# Patient Record
Sex: FEMALE | Race: WHITE | NOT HISPANIC OR LATINO | Employment: FULL TIME | URBAN - METROPOLITAN AREA
[De-identification: names, ages, dates, MRNs, and addresses within clinical notes are randomized per-mention and may not be internally consistent; named-entity substitution may affect disease eponyms.]

---

## 2017-01-19 ENCOUNTER — COMMUNICATION - HEALTHEAST (OUTPATIENT)
Dept: PHYSICAL MEDICINE AND REHAB | Facility: CLINIC | Age: 27
End: 2017-01-19

## 2017-01-19 DIAGNOSIS — M54.2 CERVICALGIA: ICD-10-CM

## 2017-01-19 DIAGNOSIS — M54.50 LUMBAGO: ICD-10-CM

## 2017-01-19 DIAGNOSIS — M54.6 PAIN IN THORACIC SPINE: ICD-10-CM

## 2017-01-25 ENCOUNTER — OFFICE VISIT - HEALTHEAST (OUTPATIENT)
Dept: FAMILY MEDICINE | Facility: CLINIC | Age: 27
End: 2017-01-25

## 2017-01-25 DIAGNOSIS — G43.109 CLASSIC MIGRAINE: ICD-10-CM

## 2017-04-10 ENCOUNTER — COMMUNICATION - HEALTHEAST (OUTPATIENT)
Dept: FAMILY MEDICINE | Facility: CLINIC | Age: 27
End: 2017-04-10

## 2017-05-03 ENCOUNTER — RECORDS - HEALTHEAST (OUTPATIENT)
Dept: ADMINISTRATIVE | Facility: OTHER | Age: 27
End: 2017-05-03

## 2017-06-06 ENCOUNTER — COMMUNICATION - HEALTHEAST (OUTPATIENT)
Dept: FAMILY MEDICINE | Facility: CLINIC | Age: 27
End: 2017-06-06

## 2017-06-06 DIAGNOSIS — F41.9 ANXIETY: ICD-10-CM

## 2017-06-06 DIAGNOSIS — F31.12 BIPOLAR I DISORDER, MOST RECENT EPISODE (OR CURRENT) MANIC, MODERATE (H): ICD-10-CM

## 2017-06-08 ENCOUNTER — HOSPITAL ENCOUNTER (OUTPATIENT)
Dept: PHYSICAL MEDICINE AND REHAB | Facility: CLINIC | Age: 27
Discharge: HOME | End: 2017-06-08
Attending: PHYSICIAN ASSISTANT

## 2017-06-08 DIAGNOSIS — G89.29 CHRONIC BILATERAL LOW BACK PAIN WITHOUT SCIATICA: ICD-10-CM

## 2017-06-08 DIAGNOSIS — M54.2 CERVICALGIA: ICD-10-CM

## 2017-06-08 DIAGNOSIS — M54.6 THORACIC BACK PAIN: ICD-10-CM

## 2017-06-08 DIAGNOSIS — M54.50 CHRONIC BILATERAL LOW BACK PAIN WITHOUT SCIATICA: ICD-10-CM

## 2017-06-12 ENCOUNTER — COMMUNICATION - HEALTHEAST (OUTPATIENT)
Dept: SCHEDULING | Facility: CLINIC | Age: 27
End: 2017-06-12

## 2017-06-22 ENCOUNTER — RECORDS - HEALTHEAST (OUTPATIENT)
Dept: ADMINISTRATIVE | Facility: OTHER | Age: 27
End: 2017-06-22

## 2017-06-23 ENCOUNTER — RECORDS - HEALTHEAST (OUTPATIENT)
Dept: ADMINISTRATIVE | Facility: OTHER | Age: 27
End: 2017-06-23

## 2017-09-18 ENCOUNTER — OFFICE VISIT - HEALTHEAST (OUTPATIENT)
Dept: FAMILY MEDICINE | Facility: CLINIC | Age: 27
End: 2017-09-18

## 2017-09-18 DIAGNOSIS — R63.5 ABNORMAL WEIGHT GAIN: ICD-10-CM

## 2017-09-18 DIAGNOSIS — J30.9 ALLERGIC RHINITIS: ICD-10-CM

## 2017-09-18 LAB
CHOLEST SERPL-MCNC: 203 MG/DL
FASTING STATUS PATIENT QL REPORTED: NO
HBA1C MFR BLD: 5.4 % (ref 3.5–6)
HDLC SERPL-MCNC: 63 MG/DL
LDLC SERPL CALC-MCNC: 84 MG/DL
TRIGL SERPL-MCNC: 281 MG/DL

## 2017-09-20 ENCOUNTER — COMMUNICATION - HEALTHEAST (OUTPATIENT)
Dept: FAMILY MEDICINE | Facility: CLINIC | Age: 27
End: 2017-09-20

## 2017-10-04 ENCOUNTER — COMMUNICATION - HEALTHEAST (OUTPATIENT)
Dept: FAMILY MEDICINE | Facility: CLINIC | Age: 27
End: 2017-10-04

## 2017-10-04 DIAGNOSIS — Z30.9 CONTRACEPTION MANAGEMENT: ICD-10-CM

## 2017-10-05 ENCOUNTER — COMMUNICATION - HEALTHEAST (OUTPATIENT)
Dept: PHYSICAL MEDICINE AND REHAB | Facility: CLINIC | Age: 27
End: 2017-10-05

## 2017-10-05 ENCOUNTER — COMMUNICATION - HEALTHEAST (OUTPATIENT)
Dept: FAMILY MEDICINE | Facility: CLINIC | Age: 27
End: 2017-10-05

## 2017-10-06 ENCOUNTER — COMMUNICATION - HEALTHEAST (OUTPATIENT)
Dept: FAMILY MEDICINE | Facility: CLINIC | Age: 27
End: 2017-10-06

## 2017-10-06 ENCOUNTER — COMMUNICATION - HEALTHEAST (OUTPATIENT)
Dept: PHYSICAL MEDICINE AND REHAB | Facility: CLINIC | Age: 27
End: 2017-10-06

## 2017-10-10 ENCOUNTER — AMBULATORY - HEALTHEAST (OUTPATIENT)
Dept: FAMILY MEDICINE | Facility: CLINIC | Age: 27
End: 2017-10-10

## 2017-10-10 ENCOUNTER — OFFICE VISIT - HEALTHEAST (OUTPATIENT)
Dept: FAMILY MEDICINE | Facility: CLINIC | Age: 27
End: 2017-10-10

## 2017-10-10 DIAGNOSIS — Z01.818 PREOPERATIVE EVALUATION TO RULE OUT SURGICAL CONTRAINDICATION: ICD-10-CM

## 2017-10-10 DIAGNOSIS — Z23 NEEDS FLU SHOT: ICD-10-CM

## 2017-10-10 DIAGNOSIS — N62 LARGE BREASTS: ICD-10-CM

## 2017-12-26 ENCOUNTER — COMMUNICATION - HEALTHEAST (OUTPATIENT)
Dept: FAMILY MEDICINE | Facility: CLINIC | Age: 27
End: 2017-12-26

## 2017-12-26 DIAGNOSIS — Z30.9 CONTRACEPTION MANAGEMENT: ICD-10-CM

## 2018-02-26 ENCOUNTER — OFFICE VISIT - HEALTHEAST (OUTPATIENT)
Dept: FAMILY MEDICINE | Facility: CLINIC | Age: 28
End: 2018-02-26

## 2018-02-26 DIAGNOSIS — Z30.9 CONTRACEPTION MANAGEMENT: ICD-10-CM

## 2018-02-26 DIAGNOSIS — Z01.419 VISIT FOR ROUTINE GYN EXAM: ICD-10-CM

## 2018-02-28 LAB
BKR LAB AP ABNORMAL BLEEDING: NO
BKR LAB AP BIRTH CONTROL/HORMONES: NORMAL
BKR LAB AP CERVICAL APPEARANCE: NORMAL
BKR LAB AP GYN ADEQUACY: NORMAL
BKR LAB AP GYN INTERPRETATION: NORMAL
BKR LAB AP HPV REFLEX: NORMAL
BKR LAB AP LMP: NORMAL
BKR LAB AP PATIENT STATUS: NORMAL
BKR LAB AP PREVIOUS ABNORMAL: NORMAL
BKR LAB AP PREVIOUS NORMAL: 2015
HIGH RISK?: NO
PATH REPORT.COMMENTS IMP SPEC: NORMAL
RESULT FLAG (HE HISTORICAL CONVERSION): NORMAL

## 2018-11-30 ENCOUNTER — COMMUNICATION - HEALTHEAST (OUTPATIENT)
Dept: FAMILY MEDICINE | Facility: CLINIC | Age: 28
End: 2018-11-30

## 2018-11-30 DIAGNOSIS — J30.9 ALLERGIC RHINITIS: ICD-10-CM

## 2018-12-07 ENCOUNTER — COMMUNICATION - HEALTHEAST (OUTPATIENT)
Dept: FAMILY MEDICINE | Facility: CLINIC | Age: 28
End: 2018-12-07

## 2019-01-01 ENCOUNTER — RECORDS - HEALTHEAST (OUTPATIENT)
Dept: ADMINISTRATIVE | Facility: OTHER | Age: 29
End: 2019-01-01

## 2019-03-14 ENCOUNTER — COMMUNICATION - HEALTHEAST (OUTPATIENT)
Dept: FAMILY MEDICINE | Facility: CLINIC | Age: 29
End: 2019-03-14

## 2019-03-15 ENCOUNTER — OFFICE VISIT - HEALTHEAST (OUTPATIENT)
Dept: FAMILY MEDICINE | Facility: CLINIC | Age: 29
End: 2019-03-15

## 2019-03-15 DIAGNOSIS — Z00.00 ROUTINE GENERAL MEDICAL EXAMINATION AT A HEALTH CARE FACILITY: ICD-10-CM

## 2019-03-15 DIAGNOSIS — F41.1 GENERALIZED ANXIETY DISORDER: ICD-10-CM

## 2019-03-15 DIAGNOSIS — J30.9 ALLERGIC RHINITIS: ICD-10-CM

## 2019-03-15 DIAGNOSIS — F39 MOOD DISORDER (H): ICD-10-CM

## 2019-03-15 ASSESSMENT — MIFFLIN-ST. JEOR: SCORE: 1418.96

## 2019-06-18 ENCOUNTER — COMMUNICATION - HEALTHEAST (OUTPATIENT)
Dept: FAMILY MEDICINE | Facility: CLINIC | Age: 29
End: 2019-06-18

## 2019-12-12 ENCOUNTER — OFFICE VISIT - HEALTHEAST (OUTPATIENT)
Dept: FAMILY MEDICINE | Facility: CLINIC | Age: 29
End: 2019-12-12

## 2019-12-12 DIAGNOSIS — Z71.84 TRAVEL ADVICE ENCOUNTER: ICD-10-CM

## 2019-12-12 ASSESSMENT — MIFFLIN-ST. JEOR: SCORE: 1431.43

## 2020-07-23 ENCOUNTER — COMMUNICATION - HEALTHEAST (OUTPATIENT)
Dept: FAMILY MEDICINE | Facility: CLINIC | Age: 30
End: 2020-07-23

## 2020-07-23 DIAGNOSIS — J30.9 ALLERGIC RHINITIS: ICD-10-CM

## 2020-09-30 ENCOUNTER — COMMUNICATION - HEALTHEAST (OUTPATIENT)
Dept: FAMILY MEDICINE | Facility: CLINIC | Age: 30
End: 2020-09-30

## 2021-02-05 ENCOUNTER — OFFICE VISIT - HEALTHEAST (OUTPATIENT)
Dept: FAMILY MEDICINE | Facility: CLINIC | Age: 31
End: 2021-02-05

## 2021-02-05 ENCOUNTER — COMMUNICATION - HEALTHEAST (OUTPATIENT)
Dept: FAMILY MEDICINE | Facility: CLINIC | Age: 31
End: 2021-02-05

## 2021-02-05 DIAGNOSIS — Z23 NEED FOR PROPHYLACTIC VACCINATION WITH TETANUS-DIPHTHERIA (TD): ICD-10-CM

## 2021-02-05 DIAGNOSIS — L70.0 CYSTIC ACNE: ICD-10-CM

## 2021-02-05 DIAGNOSIS — Z12.4 SCREENING FOR CERVICAL CANCER: ICD-10-CM

## 2021-02-05 DIAGNOSIS — G24.5 BLEPHAROSPASM OF RIGHT EYE: ICD-10-CM

## 2021-02-05 DIAGNOSIS — Z00.00 ROUTINE GENERAL MEDICAL EXAMINATION AT A HEALTH CARE FACILITY: ICD-10-CM

## 2021-02-05 DIAGNOSIS — Z13.220 LIPID SCREENING: ICD-10-CM

## 2021-02-05 DIAGNOSIS — T78.3XXD ANGIOEDEMA, SUBSEQUENT ENCOUNTER: ICD-10-CM

## 2021-02-05 LAB
ANION GAP SERPL CALCULATED.3IONS-SCNC: 6 MMOL/L (ref 5–18)
BUN SERPL-MCNC: 11 MG/DL (ref 8–22)
CALCIUM SERPL-MCNC: 9.7 MG/DL (ref 8.5–10.5)
CHLORIDE BLD-SCNC: 103 MMOL/L (ref 98–107)
CHOLEST SERPL-MCNC: 196 MG/DL
CO2 SERPL-SCNC: 27 MMOL/L (ref 22–31)
CREAT SERPL-MCNC: 0.77 MG/DL (ref 0.6–1.1)
FASTING STATUS PATIENT QL REPORTED: YES
GFR SERPL CREATININE-BSD FRML MDRD: >60 ML/MIN/1.73M2
GLUCOSE BLD-MCNC: 83 MG/DL (ref 70–125)
HBA1C MFR BLD: 5.5 %
HDLC SERPL-MCNC: 49 MG/DL
LDLC SERPL CALC-MCNC: 132 MG/DL
POTASSIUM BLD-SCNC: 4.3 MMOL/L (ref 3.5–5)
SODIUM SERPL-SCNC: 136 MMOL/L (ref 136–145)
TRIGL SERPL-MCNC: 77 MG/DL

## 2021-02-05 ASSESSMENT — MIFFLIN-ST. JEOR: SCORE: 1499.69

## 2021-02-08 LAB
HPV SOURCE: NORMAL
HUMAN PAPILLOMA VIRUS 16 DNA: NEGATIVE
HUMAN PAPILLOMA VIRUS 18 DNA: NEGATIVE
HUMAN PAPILLOMA VIRUS FINAL DIAGNOSIS: NORMAL
HUMAN PAPILLOMA VIRUS OTHER HR: NEGATIVE
SPECIMEN DESCRIPTION: NORMAL

## 2021-02-09 ENCOUNTER — COMMUNICATION - HEALTHEAST (OUTPATIENT)
Dept: FAMILY MEDICINE | Facility: CLINIC | Age: 31
End: 2021-02-09

## 2021-02-11 ENCOUNTER — RECORDS - HEALTHEAST (OUTPATIENT)
Dept: ADMINISTRATIVE | Facility: OTHER | Age: 31
End: 2021-02-11

## 2021-02-25 ENCOUNTER — RECORDS - HEALTHEAST (OUTPATIENT)
Dept: ADMINISTRATIVE | Facility: OTHER | Age: 31
End: 2021-02-25

## 2021-02-26 ENCOUNTER — AMBULATORY - HEALTHEAST (OUTPATIENT)
Dept: NURSING | Facility: CLINIC | Age: 31
End: 2021-02-26

## 2021-03-19 ENCOUNTER — AMBULATORY - HEALTHEAST (OUTPATIENT)
Dept: NURSING | Facility: CLINIC | Age: 31
End: 2021-03-19

## 2021-05-30 VITALS — WEIGHT: 134 LBS | BODY MASS INDEX: 23.74 KG/M2

## 2021-05-31 VITALS — WEIGHT: 173.8 LBS | BODY MASS INDEX: 30.79 KG/M2

## 2021-05-31 VITALS — WEIGHT: 156 LBS | BODY MASS INDEX: 27.63 KG/M2

## 2021-05-31 VITALS — BODY MASS INDEX: 30.79 KG/M2 | HEIGHT: 63 IN

## 2021-06-01 VITALS — BODY MASS INDEX: 29.41 KG/M2 | WEIGHT: 166 LBS

## 2021-06-02 VITALS — HEIGHT: 63 IN | WEIGHT: 162 LBS | BODY MASS INDEX: 28.7 KG/M2

## 2021-06-04 VITALS
HEART RATE: 72 BPM | HEIGHT: 63 IN | SYSTOLIC BLOOD PRESSURE: 100 MMHG | WEIGHT: 164.75 LBS | BODY MASS INDEX: 29.19 KG/M2 | DIASTOLIC BLOOD PRESSURE: 72 MMHG | OXYGEN SATURATION: 99 %

## 2021-06-04 NOTE — PROGRESS NOTES
HPI:  Patient is a 30 yo female who presents today for travel consult.    Patient is leaving December 22nd to [facility] with her family for 2.5 weeks.  They will be staying with her uncle who has lived there for over 30 years.  She will be staying mostly in larger cities, but there will be some jungle treks.  She has travelled abroad in the past.    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  Her boss is very supportive of her leave.    Patient Active Problem List    Diagnosis Date Noted     Reflux esophagitis 01/25/2015     Unspecified vitamin D deficiency 01/25/2015     Polyarthritis Of Multiple Sites      Neck Pain      Lower Back Pain      Allergic Rhinitis      Acne      Irregular Length Of Menstrual Periods      External Hemorrhoids With Bleeding      Perioral Dermatitis      Abnormal Weight Gain      Midback Pain        Current Outpatient Medications:      ALPRAZolam (XANAX) 0.5 MG tablet, Take 0.5 mg by mouth daily as needed for sleep., Disp: , Rfl:      clindamycin (CLEOCIN T) 1 % Swab, APPLY TOPICALLY TO AFFECTED AREA BID, Disp: , Rfl: 5     cyproheptadine (PERIACTIN) 4 mg tablet, Take 4 mg by mouth as needed. As need for stomach episodes, Disp: , Rfl:      dapsone 5 % topical gel, SARAH EXT AA BID, Disp: , Rfl: 5     docosahexanoic acid/epa (FISH OIL ORAL), [place] 3, Disp: , Rfl:      fluticasone (FLONASE) 50 mcg/actuation nasal spray, SHAKE LIQUID AND USE 1 SPRAY IN EACH NOSTRIL DAILY, Disp: 48 g, Rfl: 6     hydrOXYzine HCl (ATARAX) 10 MG tablet, Take 10 mg by mouth., Disp: , Rfl:      spironolactone (ALDACTONE) 50 MG tablet, Take 50 mg by mouth 2 (two) times a day., Disp: , Rfl: 6     triamcinolone (KENALOG) 0.1 % cream, Apply topically., Disp: , Rfl:      atovaquone-proguanil (MALARONE) 250-100 mg Tab, Take one tablet daily with breakfast, start 3 days before the trip and continue for 7 days after  "return., Disp: 24 tablet, Rfl: 0     minocycline (MINOCIN,DYNACIN) 50 MG capsule, Take 50 mg by mouth., Disp: , Rfl:      naproxen (NAPROSYN) 500 MG tablet, Take 1 tablet (500 mg total) by mouth 2 (two) times a day as needed., Disp: 60 tablet, Rfl: 2      Objective     /72 (Patient Site: Left Arm, Patient Position: Sitting, Cuff Size: Adult Regular)   Pulse 72   Ht 5' 3\" (1.6 m)   Wt 164 lb 12 oz (74.7 kg)   SpO2 99%   BMI 29.18 kg/m    General appearance: alert, appears stated age and cooperative     Loyda was seen today for travel consult.    Diagnoses and all orders for this visit:    Travel advice encounter  -     Typhoid, Inactive, Inj  -     atovaquone-proguanil (MALARONE) 250-100 mg Tab; Take one tablet daily with breakfast, start 3 days before the trip and continue for 7 days after return.  -     azithromycin (ZITHROMAX) 500 MG tablet; Take 1 tablet (500 mg total) by mouth daily for 3 days. For diarrhea with fever or blood.  Reviewed vaccination recommendations for travelers to [facility] per the CDC.  Recommend Typhoid vaccination by injection today as she does not have time to complete the oral vaccine.  Reviewed options for malaria prophylaxis.  Patient would like to move forward with malarone due to her history of Bipolar Disorder.  Reviewed recommendations for avoidance of traveller's diarrhea.  Discussed the need for DEET mosquito spray as not all mosquito borne illnesses are vaccine preventable.    Other orders  -     Influenza, Seasonal Quad, PF =/> 6months          "

## 2021-06-05 VITALS
OXYGEN SATURATION: 99 % | SYSTOLIC BLOOD PRESSURE: 112 MMHG | HEART RATE: 54 BPM | BODY MASS INDEX: 30.1 KG/M2 | DIASTOLIC BLOOD PRESSURE: 70 MMHG | WEIGHT: 176.3 LBS | HEIGHT: 64 IN

## 2021-06-08 NOTE — PROGRESS NOTES
Patient is a 25 yo female who presents with headaches.    Started having headaches about 8 months ago.  Patient had a severe headache that came on all of sudden then, was in the middle of a conversation.  It is always on the right side of her head.  Feels like there is tension in the back of her neck.  Describes it as a dull pain, somewhat vice-like.  While it is occurring difficult to focus or talk.  They last 15 minutes to 1.5 hours.  Laying down and taking a nap early enough can be helpful.  Naproxen seems to help a little bit, but not a lot.  Has a history of migraines - excedrin usually helps.  Takes naproxen once a month.  They occur once a week, more with stress.  No visual changes.  No phonophobia or photophia.  Started on invisalign around the same time it has started.  Has 80 days left.  Therapy has been intense lately.  There is talk about an alternate diagnosis other than bipolar, medication adjustment.        ROS:  ENT: No congestion, no URI symptoms.  Neuro: No paresthesias, no slurring of speech.  CV: No chest pains, no palpiations.      Current Outpatient Prescriptions:      adapalene (DIFFERIN) 0.1 % cream, , Disp: , Rfl: 2     ALPRAZolam (XANAX) 0.5 MG tablet, Take 0.5 mg by mouth daily as needed for sleep., Disp: , Rfl:      cetirizine (ZYRTEC) 10 MG tablet, Take 10 mg by mouth., Disp: , Rfl:      cyproheptadine (PERIACTIN) 4 mg tablet, Take 4 mg by mouth as needed. As need for stomach episodes, Disp: , Rfl:      dicyclomine (BENTYL) 10 MG capsule, Take 10 mg by mouth 3 (three) times a day., Disp: , Rfl:      divalproex (DEPAKOTE) 500 MG 24 hr tablet, Take 1,000 mg by mouth daily. , Disp: , Rfl:      methocarbamol (ROBAXIN) 750 MG tablet, TAKE 1 TABLET BY MOUTH THREE TIMES DAILY AS NEEDED, Disp: 60 tablet, Rfl: 3     minocycline (MINOCIN,DYNACIN) 100 MG capsule, , Disp: , Rfl: 5     naproxen sodium (ANAPROX) 550 MG tablet, TAKE 1 TABLET BY MOUTH EVERY 12 HOURS AS NEEDED, Disp: 60 tablet, Rfl: 3      OMEGA-3S/DHA/EPA/FISH OIL/D3 (VITAMIN-D + OMEGA-3 ORAL), Take by mouth 2 (two) times a day., Disp: , Rfl:      omeprazole (PRILOSEC) 10 MG capsule, Take 10 mg by mouth., Disp: , Rfl:      RECLIPSEN, 28, 0.15-0.03 mg per tablet, TAKE 1 TABLET BY MOUTH EVERY DAY AS DIRECTED, Disp: 84 tablet, Rfl: 3     topiramate (TOPAMAX) 100 MG tablet, Take 100 mg by mouth 2 (two) times a day. Take one in the am and one in the pm, Disp: , Rfl:      vilazodone (VIIBRYD) 10 mg Tab, Take by mouth., Disp: , Rfl:     Patient Active Problem List    Diagnosis Date Noted     Reflux esophagitis 01/25/2015     Unspecified vitamin D deficiency 01/25/2015     Polyarthritis Of Multiple Sites      Neck Pain      Lower Back Pain      Allergic Rhinitis      Acne      Dermatitis      Irregular Length Of Menstrual Periods      Vaginitis      Bipolar I Disorder, Most Recent Episode, Manic - Moderate      Acute Pharyngitis      Chest Tightness Or Heavy Pressure      External Hemorrhoids With Bleeding      Perioral Dermatitis      Abnormal Weight Gain      Gastroenteritis      Bipolar Disorder      Bipolar Disorder NOS      Midback Pain       Objective     Visit Vitals     /68 (Patient Site: Left Arm, Patient Position: Sitting)     Pulse 90     Wt 134 lb (60.8 kg)     LMP 01/02/2017     SpO2 99%     Breastfeeding No     BMI 23.74 kg/m2     General appearance: alert, appears stated age and cooperative  Head: Normocephalic, without obvious abnormality, atraumatic.  No TMJ pain  Eyes: conjunctivae/corneas clear. PERRL, EOM's intact.  Ears: normal TM's and external ear canals both ears  Nose: Nares normal. Septum midline. Mucosa normal. No drainage or sinus tenderness.  Throat: lips, mucosa, and tongue normal; teeth and gums normal  Neck: no adenopathy, supple, symmetrical, trachea midline and thyroid not enlarged, symmetric, no tenderness/mass/nodules  Lungs: clear to auscultation bilaterally  Heart: regular rate and rhythm, S1, S2 normal, no  murmur, click, rub or gallop  Neurologic: Alert and oriented X 3, normal strength and tone. Normal symmetric reflexes. Normal coordination and gait  Cranial nerves: normal      Loyda was seen today for headache and follow-up.    Diagnoses and all orders for this visit:    Classic migraine  -     SUMAtriptan (IMITREX) 50 MG tablet; Take one tablet at the onset of migraine, may repeat in 2 hours.  Max is 200 mg/day.  Based on timing, this is likely related to her recent treatment with invisalign.  Her pains started not long after starting treatment.  Unfortunately the pain is significant.  Discussed use of medication and potential side effects.  Discussed instructions for use.  Patient to notify if symptoms worsen or do not improve or if side effects prove intolerable.  Recommend starting magnesium 400 mg qhs.

## 2021-06-10 NOTE — TELEPHONE ENCOUNTER
RN cannot approve Refill Request    RN can NOT refill this medication Allergy/Contraindication. Last office visit: 12/12/2019 Francheska Moran MD Last Physical: 3/15/2019 Last MTM visit: Visit date not found Last visit same specialty: 12/12/2019 Francheska Moran MD.  Next visit within 3 mo: Visit date not found  Next physical within 3 mo: Visit date not found      Chloe Rincon, [facility] Triage/Med Refill 7/26/2020    Requested Prescriptions   Pending Prescriptions Disp Refills     fluticasone propionate (FLONASE) 50 mcg/actuation nasal spray [Pharmacy Med Name: FLUTICASONE 50MCG NASAL SP (120) RX] 48 g 6     Sig: SHAKE LIQUID AND USE 1 SPRAY IN EACH NOSTRIL DAILY       Nasal Steroid Refill Protocol Passed - 7/23/2020 10:53 AM        Passed - Patient has had office visit/physical in last 2 years     Last office visit with prescriber/PCP: 12/12/2019 OR same dept: 12/12/2019 Francheska Moran MD OR same specialty: 12/12/2019 Francheska Moran MD Last physical: 3/15/2019 Last MTM visit: Visit date not found    Next appt within 3 mo: Visit date not found  Next physical within 3 mo: Visit date not found  Prescriber OR PCP: Francheska Moran MD  Last diagnosis associated with med order: 1. Allergic rhinitis  - fluticasone propionate (FLONASE) 50 mcg/actuation nasal spray [Pharmacy Med Name: FLUTICASONE 50MCG NASAL SP (120) RX]; SHAKE LIQUID AND USE 1 SPRAY IN EACH NOSTRIL DAILY  Dispense: 48 g; Refill: 6     If protocol passes may refill for 12 months if within 3 months of last provider visit (or a total of 15 months).

## 2021-06-11 NOTE — PROGRESS NOTES
Assessment:   Loyda Liang is a 26 y.o. y.o. female with past medical history significant for bipolar disorder who presents today for follow-up regarding chronic left sided neck pain, left sided mid back pain, and left greater than right lower back pain.  Patient's pain has been under relatively good control with a home exercise program including yoga, and naproxen and methocarbamol.  I believe that the patient's cervical and thoracic pain is exacerbated by her large breast size.  She is neurologically intact.       Plan:     A shared decision making plan was used.  The patient's values and choices were respected.  The following represents what was discussed and decided upon by the physician assistant and the patient.      1.  DIAGNOSTIC TESTS: No further diagnostic tests were ordered.    2.  PHYSICAL THERAPY: No further physical therapy was ordered.  The patient completed physical therapy in December 2016.  She does her home exercises.    3.  MEDICATIONS: The patient was notified that she would no longer be able to get naproxen 550 mg tabs.  I placed an order for naproxen 500 mg tabs.  The patient can also continue using methocarbamol as needed.  She takes both of these medication sparingly.    4.  INTERVENTIONS: No interventions were ordered.    5.  REFERRALS: The patient requested a referral to a plastic surgeon for consideration of breast reduction surgery.  I placed a referral for her to see Dr. Mckeon in [place].  I told patient that I support this decision to consider breast reduction surgery and I do believe that her neck and mid back pain will improve if she moves forward with surgery.    6.  FOLLOW-UP: The patient follow-up with me as needed.  If she has any questions or concerns, she should not hesitate to call.    Subjective:     Loyda Liang is a 26 y.o. female who presents today for follow-up regarding chronic left-sided neck pain, left-sided mid back pain, and left greater than right lower back  pain.  I last saw the patient on October 19, 2016.  At that time I referred the patient to physical therapy.  The patient attended 5 sessions of physical therapy, most recently on December 6, 2016.  She did state that that was helpful.  She has continued to manage her pain with exercise including yoga, and the use of naproxen and methocarbamol sparingly.    The patient complains of left-sided neck pain, left-sided mid back pain, left greater than right lower back pain.  She denies any pain radiating down the arms or legs.  She rates her pain today as a 4 out of 10.  At its best it is a 2 out of 10.  At its worst it is a 9 out of 10.  The patient's pain is aggravated with exercise.  She believes that this is due to her large breast size.  The patient wears a size 32-34 H or I bra.  Her pain is alleviated with chiropractic treatment, yoga, and stretching.  She denies any numbness, tingling, or weakness in her extremities.    As mentioned above, the patient completed physical therapy in December.  She does her home exercises.  She uses naproxen methocarbamol sparingly for pain.    Past medical history is reviewed and is unchanged in the interim.    Family history is reviewed and is unchanged in the interim.    Review of Systems:  Positive for headache, nausea/vomiting.  Negative for numbness/tingling, loss of bowel/bladder control, footdrop, weakness, dizziness, blurry vision, balance changes.     Objective:   CONSTITUTIONAL:  Vital signs as above.  No acute distress.  The patient is well nourished and well groomed.    PSYCHIATRIC:  The patient is awake, alert, oriented to person, place and time.  The patient is answering questions appropriately with clear speech.  Normal affect.  HEENT: Normocephalic, atraumatic.  Sclera clear.  Neck is supple.  SKIN:  Skin over the face, neck bilateral upper extremities is clean, dry, intact without rashes.  MUSCULOSKELETAL: The patient has 5/5 strength for the bilateral upper upper  and lower extremities throughout.  NEUROLOGICAL:  Sensation to light touch is intact over bilateral upper and lower 70s throughout.

## 2021-06-13 NOTE — PROGRESS NOTES
Subjective:     Loyda is a 27 y.o. female here for a Family Medicine pre-operative consultation. The exam is requested by Dr. Mckeon in preparation for Bilateral Breast Reduction to be performed at [facility] surgery on October 17 2017. Today s examination on 10/11/17 is done to review the underlying surgical condition, clear for anesthesia, and review medical problems with appropriate changes in medications.  Has been experiencing chronic mid upper back pain thought to be secondary to large breasts, has tried several different treatment option, included chiropractor, physical therapy, anti-inflammatory with no improvement.  Loyda has tolerated previous surgeries well without bleeding or anesthesia difficulty.      Review of Systems  Constitutional: negative for anorexia, fatigue and malaise  Eyes: negative for redness  Ears, nose, mouth, throat, and face: negative for earaches, epistaxis, nasal congestion and sore throat  Respiratory: negative for cough, dyspnea on exertion and wheezing  Cardiovascular: negative for chest pain, fatigue, irregular heart beat, near-syncope, orthopnea, palpitations, paroxysmal nocturnal dyspnea and syncope  Gastrointestinal: negative for abdominal pain, change in bowel habits, constipation, diarrhea, dyspepsia, dysphagia, melena, nausea, odynophagia and vomiting  Genitourinary:negative for abnormal menstrual periods  Hematologic/lymphatic: negative for easy bruising and lymphadenopathy  Musculoskeletal:negative for arthralgias, bone pain, myalgias and stiff joints, positive chronic mid upper back pain  Neurological: negative for coordination problems, dizziness, gait problems, headaches, tremors, vertigo and weakness  Behavioral/Psych: negative for anxiety, depression, fatigue and irritability  Endocrine: negative for temperature intolerance  Allergic/Immunologic: negative for urticaria      Objective:      /72 (Patient Site: Right Arm)  Pulse 72  Temp 98.9  F (37.2  C)  "(Oral)   Resp 13  Ht 5' 3\" (1.6 m)  LMP 07/25/2017 (Approximate) Comment: EVERY 3 MTHS  SpO2 97%    General Appearance:    Alert, cooperative, no distress, appears stated age   Head:    Normocephalic, without obvious abnormality, atraumatic   Eyes:    PERRL, conjunctiva/corneas clear, EOM's intact, fundi     benign, both eyes        Ears:    Normal TM's and external ear canals, both ears   Nose:   Nares normal, septum midline, mucosa normal, no drainage    or sinus tenderness   Throat:   Lips, mucosa, and tongue normal; teeth and gums normal   Neck:   Supple, symmetrical, trachea midline, no adenopathy;        thyroid:  No enlargement/tenderness/nodules; no carotid    bruit or JVD   Back:     Symmetric, no curvature, ROM normal, no CVA tenderness   Lungs:     Clear to auscultation bilaterally, respirations unlabored   Chest wall:    No tenderness or deformity, bilateral large breasts of about size H or K on external visualization through her clothing.   Heart:    Regular rate and rhythm, S1 and S2 normal, no murmur, rub   or gallop   Abdomen:     Soft, non-tender, bowel sounds active all four quadrants,     no masses, no organomegaly   Musculoskeletal:   no limitation of range of motion, no joint tenderness    Extremities:   Extremities normal, atraumatic, no cyanosis or edema   Pulses:   2+ and symmetric all extremities   Skin:   Skin color, texture, turgor normal, no rashes or lesions   Lymph nodes:   Cervical, supraclavicular, and axillary nodes normal   Neurologic:   CNII-XII intact. Normal strength, sensation and reflexes       throughout     Past Medical History:   Diagnosis Date     Bipolar 1 disorder      Past Surgical History:   Procedure Laterality Date     COLONOSCOPY       ESOPHAGOGASTRODUODENOSCOPY       MOUTH SURGERY         Current Outpatient Prescriptions:      ALPRAZolam (XANAX) 0.5 MG tablet, Take 0.5 mg by mouth daily as needed for sleep., Disp: , Rfl:      cetirizine (ZYRTEC) 10 MG tablet, " Take 10 mg by mouth., Disp: , Rfl:      cyproheptadine (PERIACTIN) 4 mg tablet, Take 4 mg by mouth as needed. As need for stomach episodes, Disp: , Rfl:      ENSKYCE 0.15-0.03 mg per tablet, TAKE 1 TABLET BY MOUTH EVERY DAY AS DIRECTED, Disp: 84 tablet, Rfl: 0     escitalopram oxalate (LEXAPRO) 10 MG tablet, TK 1 T PO QD, Disp: , Rfl: 1     fluticasone (FLONASE) 50 mcg/actuation nasal spray, 1 spray into each nostril daily., Disp: 18 g, Rfl: 6     methocarbamol (ROBAXIN) 750 MG tablet, TAKE 1 TABLET BY MOUTH THREE TIMES DAILY AS NEEDED, Disp: 60 tablet, Rfl: 3     naproxen (NAPROSYN) 500 MG tablet, Take 1 tablet (500 mg total) by mouth 2 (two) times a day as needed., Disp: 60 tablet, Rfl: 2     omeprazole (PRILOSEC) 10 MG capsule, Take 10 mg by mouth., Disp: , Rfl:      minocycline (MINOCIN,DYNACIN) 100 MG capsule, , Disp: , Rfl: 5  Allergies   Allergen Reactions     Aripiprazole      Annotation: Anxiety attacks       Atomoxetine      Annotation: Anxiety       Buspirone      Annotation: Severe Anxiety       Dexmethylphenidate      Dextroamphetamine-Amphetamine Rash     Divalproex      Eszopiclone      Annotation: ANxiety attacks       Fluoxetine      Annotation: Anxiety Attack       Lamotrigine Rash     Lithium Carbonate      Paliperidone Other (See Comments)     felt drugged , couldn't see eyes blurry  Drowsiness       Quetiapine      Annotation: Anxiety Attack       Ramelteon Swelling     Lips and tongue     Sertraline      Ta      Annotation: Anxiety       Trazodone Headache     Zaleplon      Annotation: Anxiety attacks       Zolpidem      Bupropion Anxiety     Geodon  [Ziprasidone Hcl] Anxiety     Methylphenidate Anxiety     Oxcarbazepine Anxiety      reports that she has never smoked. She has never used smokeless tobacco. She reports that she drinks alcohol. She reports that she does not use illicit drugs.       Predictors of intubation difficulty:   Morbid obesity? no}   Neck range of motion: normal    Dentition: No chipped, loose, or missing teeth.    Lab Review   Office Visit on 09/18/2017   Component Date Value     TSH 09/18/2017 1.95      Free T4 09/18/2017 0.7      Hemoglobin A1c 09/18/2017 5.4      Cholesterol 09/18/2017 203*     Triglycerides 09/18/2017 281*     HDL Cholesterol 09/18/2017 63      LDL Calculated 09/18/2017 84      Patient Fasting > 8hrs? 09/18/2017 No          Assessment:        27 y.o. female with planned surgery as above.    Known risk factors for perioperative complications: None    Difficulty with intubation is not anticipated.    Cardiac Risk Estimation: low    Current medications which may produce withdrawal symptoms if withheld perioperatively: None       Plan:     1. Preoperative workup as follows hemoglobin, hematocrit, electrolytes, creatinine, glucose, liver function studies.  2. Change in medication regimen before surgery: discontinue NSAIDs (Naproxen) 14 days before surgery.  3. Deep vein thrombosis prophylaxis postoperatively:intermittent pneumatic compression boots.  4. Reviewed risks (not limited to bleeding,infection,pain, cosmetic changes, possibility of a poor milk supply in the future, un-anticipated response to anesthesia and even death) and benefits (diagnostic and therapeutic) of surgery with patient, she understands the risks of the procedure and would like to proceed.  Patient's active problems diagnostically and therapeutically optimized for planned procedure with, Local or General anesthesia    Recent Results (from the past 240 hour(s))   Pregnancy, Urine   Result Value Ref Range    Pregnancy Test, Urine Negative Negative    Specific Gravity, UA 1.015 1.001 - 1.030   Comprehensive Metabolic Panel   Result Value Ref Range    Sodium 139 136 - 145 mmol/L    Potassium 4.4 3.5 - 5.0 mmol/L    Chloride 104 98 - 107 mmol/L    CO2 24 22 - 31 mmol/L    Anion Gap, Calculation 11 5 - 18 mmol/L    Glucose 82 70 - 125 mg/dL    BUN 15 8 - 22 mg/dL    Creatinine 0.77 0.60 - 1.10  mg/dL    GFR MDRD Af Amer >60 >60 mL/min/1.73m2    GFR MDRD Non Af Amer >60 >60 mL/min/1.73m2    Bilirubin, Total 0.3 0.0 - 1.0 mg/dL    Calcium 10.1 8.5 - 10.5 mg/dL    Protein, Total 7.4 6.0 - 8.0 g/dL    Albumin 3.3 (L) 3.5 - 5.0 g/dL    Alkaline Phosphatase 89 45 - 120 U/L    AST 18 0 - 40 U/L    ALT 12 0 - 45 U/L   HM1 (CBC with Diff)   Result Value Ref Range    WBC 5.8 4.0 - 11.0 thou/uL    RBC 4.37 3.80 - 5.40 mill/uL    Hemoglobin 12.2 12.0 - 16.0 g/dL    Hematocrit 36.8 35.0 - 47.0 %    MCV 84 80 - 100 fL    MCH 27.8 27.0 - 34.0 pg    MCHC 33.1 32.0 - 36.0 g/dL    RDW 13.9 11.0 - 14.5 %    Platelets 268 140 - 440 thou/uL    MPV 9.1 7.0 - 10.0 fL    Neutrophils % 55 50 - 70 %    Lymphocytes % 35 20 - 40 %    Monocytes % 8 2 - 10 %    Eosinophils % 1 0 - 6 %    Basophils % 0 0 - 2 %    Neutrophils Absolute 3.2 2.0 - 7.7 thou/uL    Lymphocytes Absolute 2.1 0.8 - 4.4 thou/uL    Monocytes Absolute 0.5 0.0 - 0.9 thou/uL    Eosinophils Absolute 0.1 0.0 - 0.4 thou/uL    Basophils Absolute 0.0 0.0 - 0.2 thou/uL     More than  50% of this 60 minutes visit was spent in counseling and coordination of care.    Nicolas Arango MD  [facility].  [address] [place] [address].  [place], [address]  Ph:[phone]  Fax:[phone]

## 2021-06-13 NOTE — PROGRESS NOTES
Patient presents today for several medical concerns.    1) Weight gain: Patient had a psychiatric consult at [facility] and it was extremely helpful.  She was started on Lexapro, and she feels better on this medication compared to all the others in the past.  She has noted quite a bit of weight gain since starting on the medication in May.  She has gained 40 lbs since she was last here in January.  She notes that she is heavier than she has ever been in her life.  She started exercising daily 30 minutes of cardio, then 15-20 minutes of strength.  Cut carb intake by 90% of rice/bread/pasta.  Does meat and veggies, some fruit sometimes, home cooked meals.   Sleeping great.  Skin is better.  Normal gut.     Considering breast reduction.  The first time she noticed weight gain was at [facility] office at 155 lbs a couple of weeks ago.  She notes the weight keeps increasing in spite of all the changes she has made.  She just wants to make sure there is no underlying thyroid disorder and that she is not developing diabetes or health issues because of the medication.    2) Left ear pressure.  Going on for a couple of weeks. Takes zyrtec around this time due to allergy type symptoms.  She suspects hay fever.      3) Last menses was very light.  Has been taking pill such that she gets 4 periods a year, last one was light, which is different.  She notes no possibility of pregnancy as she has not been sexually active.  She is wondering if that is related to the pill.  Discussed that weight gain can decrease menses as well as low thyroid.    Patient Active Problem List    Diagnosis Date Noted     Reflux esophagitis 01/25/2015     Unspecified vitamin D deficiency 01/25/2015     Polyarthritis Of Multiple Sites      Neck Pain      Lower Back Pain      Allergic Rhinitis      Acne      Dermatitis      Irregular Length Of Menstrual Periods      Vaginitis      Bipolar I Disorder, Most Recent Episode, Manic - Moderate      Acute  Pharyngitis      Chest Tightness Or Heavy Pressure      External Hemorrhoids With Bleeding      Perioral Dermatitis      Abnormal Weight Gain      Gastroenteritis      Bipolar Disorder      Bipolar Disorder NOS      Midback Pain        Current Outpatient Prescriptions:      ALPRAZolam (XANAX) 0.5 MG tablet, Take 0.5 mg by mouth daily as needed for sleep., Disp: , Rfl:      cetirizine (ZYRTEC) 10 MG tablet, Take 10 mg by mouth., Disp: , Rfl:      escitalopram oxalate (LEXAPRO) 10 MG tablet, TK 1 T PO QD, Disp: , Rfl: 1     methocarbamol (ROBAXIN) 750 MG tablet, TAKE 1 TABLET BY MOUTH THREE TIMES DAILY AS NEEDED, Disp: 60 tablet, Rfl: 3     minocycline (MINOCIN,DYNACIN) 100 MG capsule, , Disp: , Rfl: 5     naproxen (NAPROSYN) 500 MG tablet, Take 1 tablet (500 mg total) by mouth 2 (two) times a day as needed., Disp: 60 tablet, Rfl: 2     omeprazole (PRILOSEC) 10 MG capsule, Take 10 mg by mouth., Disp: , Rfl:      RECLIPSEN, 28, 0.15-0.03 mg per tablet, TAKE 1 TABLET BY MOUTH EVERY DAY AS DIRECTED, Disp: 84 tablet, Rfl: 3     cyproheptadine (PERIACTIN) 4 mg tablet, Take 4 mg by mouth as needed. As need for stomach episodes, Disp: , Rfl:      fluticasone (FLONASE) 50 mcg/actuation nasal spray, 1 spray into each nostril daily., Disp: 18 g, Rfl: 6      Objective     /68 (Patient Site: Left Arm, Patient Position: Sitting)  Pulse 92  Wt 173 lb 12.8 oz (78.8 kg)  LMP 07/18/2017  SpO2 99%  Breastfeeding? No  BMI 30.79 kg/m2  General appearance: alert, appears stated age and cooperative  Ears: normal TM's and external ear canals both ears  Nose: no discharge, pale boggy nasal mucosa bilaterally  Throat: lips, mucosa, and tongue normal; teeth and gums normal  Neck: no adenopathy and thyroid not enlarged, symmetric, no tenderness/mass/nodules  Lungs: clear to auscultation bilaterally  Heart: regular rate and rhythm, S1, S2 normal, no murmur, click, rub or gallop  Extremities: extremities normal, atraumatic, no  cyanosis or edema     Loyda was seen today for follow-up and medication refill.    Diagnoses and all orders for this visit:    Abnormal weight gain  -     Thyroid Cascade  -     T4, Free  -     Glycosylated Hemoglobin A1c  -     Lipid Cascade  Will check labs to assess her health status as well as her thyroid.   If the thyroid is normal, she will discuss with her psychiatrist at [facility] her issues with weight gain on lexapro.  She is leading a very healthy lifestyle, which is why it is baffling the amount of weight gain that has occurred.    Allergic rhinitis  -     fluticasone (FLONASE) 50 mcg/actuation nasal spray; 1 spray into each nostril daily.  Discussed that zyrtec could be used prn along with this medication.  Discussed instructions for use.  Discussed that ideally, she start this two weeks prior to her allergy season next year.

## 2021-06-15 NOTE — TELEPHONE ENCOUNTER
Dr Moran    The pap you did on her today still needs the order placed    Mai Galicia CMA ([facility])

## 2021-06-15 NOTE — PROGRESS NOTES
FEMALE ADULT PREVENTIVE EXAM    CHIEF COMPLAINT:  Female preventive exam.    SUBJECTIVE:  Loyda Liang is a 30 y.o. female who presents for her routine physical exam.    Patient would like to address the following concerns today:     Pandemic has been difficult.  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  Sees Mom, Dad and Maribel occasionally. Going to therapy regularly and seeing chiropractor.  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 Excited for normal hours.  Hoping with structure can prioritize her health goals.  Uncle passed away from COVID.      Birth control:  Her therapist thinks she would benefit.  On spironolactone twice a day for her acne by dermatology.    Was diagnosed with angioedema of the intestinal lining by Dr. Merchant whom she saw at [facility] about 8-10 years ago.  Was treated with ciprohepatide.  Would like to establish care with GI for this.    Right inner lid twitching.  On and off for 4 months.      GYNE HISTORY  Menses: regular, not that bad.    Sexually Active: not in years  Contraception: none  Last Pap: 2018 normal.    Abnormal Pap: no  Vaginal Discharge: no      She  has a past medical history of Bipolar 1 disorder (H).    Lab Results   Component Value Date    WBC 5.8 10/10/2017    HGB 12.2 10/10/2017    HCT 36.8 10/10/2017    MCV 84 10/10/2017     10/10/2017     02/05/2021    K 4.3 02/05/2021    BUN 11 02/05/2021     Lab Results   Component Value Date    CHOL 196 02/05/2021    HDL 49 (L) 02/05/2021    LDLCALC 132 (H) 02/05/2021    TRIG 77 02/05/2021     Lab Results   Component Value Date    TSH 1.95 09/18/2017     BP Readings from Last 3 Encounters:   02/05/21 112/70   12/12/19 100/72   03/15/19 106/62       Surgeries:    Past Surgical History:   Procedure Laterality Date     COLONOSCOPY       ESOPHAGOGASTRODUODENOSCOPY       MOUTH SURGERY       REDUCTION MAMMAPLASTY Bilateral        Family History:  Her family  history includes Breast cancer in her paternal grandmother; Early death in her paternal grandfather; Heart disease in her maternal grandmother; Hyperlipidemia in her father and mother; Hypertension in her father and mother; Multiple myeloma in her maternal grandmother; Stroke in her father and paternal grandfather.    Social History:  She  reports that she has never smoked. She has never used smokeless tobacco. She reports current alcohol use. She reports that she does not use drugs.    Medications:    Current Outpatient Medications:      clindamycin (CLEOCIN T) 1 % Swab, APPLY TOPICALLY TO AFFECTED AREA BID, Disp: , Rfl: 5     cyproheptadine (PERIACTIN) 4 mg tablet, Take 4 mg by mouth as needed. As need for stomach episodes, Disp: , Rfl:      dapsone 5 % topical gel, SARAH EXT AA BID, Disp: , Rfl: 5     docosahexanoic acid/epa (FISH OIL ORAL), Clifford 3, Disp: , Rfl:      fluticasone propionate (FLONASE) 50 mcg/actuation nasal spray, SHAKE LIQUID AND USE 1 SPRAY IN EACH NOSTRIL DAILY, Disp: 48 g, Rfl: 6     hydrOXYzine HCl (ATARAX) 10 MG tablet, Take 10 mg by mouth., Disp: , Rfl:      spironolactone (ALDACTONE) 50 MG tablet, Take 50 mg by mouth 2 (two) times a day., Disp: , Rfl: 6     triamcinolone (KENALOG) 0.1 % cream, Apply topically., Disp: , Rfl:      ALPRAZolam (XANAX) 0.5 MG tablet, Take 0.5 mg by mouth daily as needed for sleep., Disp: , Rfl:      levonorgestrel-ethinyl estradiol (AVIANE,ALESSE,LESSINA) 0.1-20 mg-mcg per tablet, Take 1 tablet by mouth daily., Disp: 3 Package, Rfl: 4      Allergies:  No latex allergies.  Allergies   Allergen Reactions     Aripiprazole      Annotation: Anxiety attacks       Atomoxetine      Annotation: Anxiety       Buspirone      Annotation: Severe Anxiety       Dexmethylphenidate      Dextroamphetamine-Amphetamine Rash     Divalproex      Eszopiclone      Annotation: ANxiety attacks       Fluoxetine      Annotation: Anxiety Attack       Lamotrigine Rash     Lithium Carbonate       Paliperidone Other (See Comments)     felt drugged , couldn't see eyes blurry  Drowsiness       Quetiapine      Annotation: Anxiety Attack       Ramelteon Swelling     Lips and tongue     Sertraline      Ta      Annotation: Anxiety       Trazodone Headache     Zaleplon      Annotation: Anxiety attacks       Zolpidem      Bupropion Anxiety     Geodon  [Ziprasidone Hcl] Anxiety     Methylphenidate Anxiety     Oxcarbazepine Anxiety        RISK BEHAVIOR & HEALTH HABITS  Self Breast Exam: Had breast reduction surgery.    Regular Exercise: no  Balanced diet: more convenience foods, inconsistent.  Working towards healthier options.  Seat Belt Use: yes  Calcium intake/Osteoporosis prevention: no    Guns: NO  Sun Screen: YES  Dental Care: YES    REVIEW OF SYSTEMS:  Complete head to toe review of systems is otherwise negative except as above.    OBJECTIVE:  VITAL SIGNS:    Vitals:    02/05/21 0843   BP: 112/70   Pulse: (!) 54   SpO2: 99%     GENERAL:  Patient alert, in no acute distress.  EYES: PERRLA.   ENT:  Hearing grossly normal.  Normal appearance to ears and nose.  Bilateral TM s, external canals, oropharynx normal. Normal lips, gums and teeth.  Normal nasal mucosa, septum and turbinates.  NECK:  Supple, without thyromegaly or mass.  RESP:  Clear to auscultation without crackles, wheezes or distress.  Normal respiratory effort.   CV:  Regular rate and rhythm without murmurs, rubs or gallops.    ABDOMEN:  Soft, non-tender, without hepatosplenomegaly, masses, or hernias.   BREASTS:  Nontender, without masses, nipple discharge, erythema, or axillary adenopathy.    :    External genitalia:  Normal without lesions.  Urethra: Normal appearing  Vagina: Normal without discharge  Cervix: normal.  Uterus:  Nontender, mobile, without masses  Ovaries: Normal without masses  LYMPHATIC: Normal palpation of neck and axilla..  No lymphadenopathy.  No bruising.  NEURO:  CN II-XII intact, motor & sensory function all intact.     PSYCHIATRIC:  Alert & oriented with normal mood and affect.  Good judgment and insight.  SKIN:  Normal inspection and palpation.  MUSCULOSKELETAL: Normal gait and station.    ASSESSMENT & PLAN  Loyda was seen today for annual exam.    Diagnoses and all orders for this visit:    Routine general medical examination at a health care facility  -     levonorgestrel-ethinyl estradiol (AVIANE,ALESSE,LESSINA) 0.1-20 mg-mcg per tablet; Take 1 tablet by mouth daily.  Will restart on aviane. Discussed use and potential side effects.  Reviewed family history for high risk screening.  Due for Tdap today.    Need for prophylactic vaccination with tetanus-diphtheria (Td)  -     Tdap vaccine,  6yo or older,  IM    Angioedema, subsequent encounter  -     Ambulatory referral to Gastroenterology    Blepharospasm of right eye  -     Ambulatory referral [facility] - [facility]  This may be related to poor sleep and hydration.  Recommend pushing fluids.  Patient is getting a normal 9-5 job.    Cystic acne  -     Basic Metabolic Panel  Patient prescribed spironolactone by dermatology.    Lipid screening  -     Lipid Cascade FASTING    BMI 30.0-30.9,adult  -     Glycosylated Hemoglobin A1c  Patient has plans to resume working out once she is settled with her job.  She is working with her therapist and will be taking in adjustments one step at a time, which I feel is very reasonable.    Screening for cervical cancer  -     Gynecologic Cytology (PAP Smear)  -     HPV High Risk DNA Cervical

## 2021-06-16 NOTE — PROGRESS NOTES
Patient is a 28 yo female who is here to follow up from her bilateral breast reduction.    They took out 8 lbs of breast tissue and already patient notes that she has improvement in her upper back pain.  Her recovery was complicated by infection in the right breast that opened up the incision leaving some exposed sutures.  She went back to work after only 3 days after the surgery, which she admits was probably too early.  She is wearing scar pads and only occasionally feels itching as the nerves heal.    With regards to her weight, her psychiatrist did wean her off of the lexapro due to the weight gain.  She is now doing whole 30 with her mother.  She has started to walk/run 30 min per day.  She has cut simple sugars.    She would like a refill in her birth control today.  She is due for a pap smear.    Menses: regular on OCP  Sexually active: Not currently.  No new partners.  Declines STD testing.  Last pap: 2015 - normal  Abnormal pap: none    Objective     /68 (Patient Site: Right Arm, Patient Position: Sitting)  Pulse 89  Wt 166 lb (75.3 kg)  SpO2 99%  Breastfeeding? No  BMI 29.41 kg/m2  General appearance: alert, appears stated age and cooperative  Pelvic: cervix normal in appearance, external genitalia normal, no adnexal masses or tenderness, no cervical motion tenderness, uterus normal size, shape, and consistency and vagina normal without discharge      Loyda was seen today for post-op problem and medication refill.    Diagnoses and all orders for this visit:    Visit for routine gyn exam  -     Gynecologic Cytology (PAP Smear)  Reviewed ACOG guidelines.    Contraception management  -     desogestrel-ethinyl estradiol (ENSKYCE) 0.15-0.03 mg per tablet; Take 1 tablet by mouth daily. As directed

## 2021-06-25 NOTE — PROGRESS NOTES
"FEMALE ADULT PREVENTIVE EXAM    CHIEF COMPLAINT:  Female preventive exam.    SUBJECTIVE:  Loyda Liang is a 28 y.o. female who presents for her routine physical exam.    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  People are great.  Going back to school to finish undergrade in sociology.  Then hopefully law and public policy.  Broke left wrist, just got her cast off.      Taking vitamins from chiropractor.  No longer taking birth control.  Living in [place], bought a condo.     Lost 15 lbs, exercising regularly.  Meal prepping.  Sleep is improved. Acne is a little bit worse.      Had successful breast reduction surgery went well.  Removed 8 lbs.    Biggest news this year is that she had a psychologic evaluation at [facility] and they noted that she DOES NOT have bipolar disorder.  Instead, they diagnosed her with anxiety and dealing with posttraumatic stress.  They stopped all of her medications for bipolar disorder and she feels a lot better.    GYNE HISTORY  Menses: only two months off of pill.  \"semiregular\".  Lasts 3-5 days.    Sexually Active: not currently.  Contraception: none  Last Pap: 2018 - normal.  Abnormal Pap: none  Vaginal Discharge: none      She  has a past medical history of Bipolar 1 disorder (H).    Lab Results   Component Value Date    WBC 5.8 10/10/2017    HGB 12.2 10/10/2017    HCT 36.8 10/10/2017    MCV 84 10/10/2017     10/10/2017     10/10/2017    K 4.4 10/10/2017    BUN 15 10/10/2017     Lab Results   Component Value Date    CHOL 203 (H) 09/18/2017    HDL 63 09/18/2017    LDLCALC 84 09/18/2017    TRIG 281 (H) 09/18/2017     Lab Results   Component Value Date    TSH 1.95 09/18/2017     BP Readings from Last 3 Encounters:   03/15/19 106/62   02/26/18 110/68   10/10/17 112/72       Surgeries:    Past Surgical History:   Procedure Laterality Date     COLONOSCOPY       ESOPHAGOGASTRODUODENOSCOPY       " MOUTH SURGERY       REDUCTION MAMMAPLASTY Bilateral        Family History:  Her family history includes Breast cancer in her paternal grandmother; Early death in her paternal grandfather; Heart disease in her maternal grandmother; Hyperlipidemia in her father and mother; Hypertension in her father and mother; Multiple myeloma in her maternal grandmother; Stroke in her father.    Social History:  She  reports that  has never smoked. she has never used smokeless tobacco. She reports that she drinks alcohol. She reports that she does not use drugs.    Medications:    Current Outpatient Medications:      ALPRAZolam (XANAX) 0.5 MG tablet, Take 0.5 mg by mouth daily as needed for sleep., Disp: , Rfl:      cyproheptadine (PERIACTIN) 4 mg tablet, Take 4 mg by mouth as needed. As need for stomach episodes, Disp: , Rfl:      fluticasone (FLONASE) 50 mcg/actuation nasal spray, SHAKE LIQUID AND USE 1 SPRAY IN EACH NOSTRIL DAILY, Disp: 48 g, Rfl: 6     hydrOXYzine HCl (ATARAX) 10 MG tablet, Take 10 mg by mouth., Disp: , Rfl:      minocycline (MINOCIN,DYNACIN) 50 MG capsule, Take 50 mg by mouth., Disp: , Rfl:      naproxen (NAPROSYN) 500 MG tablet, Take 1 tablet (500 mg total) by mouth 2 (two) times a day as needed., Disp: 60 tablet, Rfl: 2     triamcinolone (KENALOG) 0.1 % cream, Apply topically., Disp: , Rfl:       Allergies:  No latex allergies.  Allergies   Allergen Reactions     Aripiprazole      Annotation: Anxiety attacks       Atomoxetine      Annotation: Anxiety       Buspirone      Annotation: Severe Anxiety       Dexmethylphenidate      Dextroamphetamine-Amphetamine Rash     Divalproex      Eszopiclone      Annotation: ANxiety attacks       Fluoxetine      Annotation: Anxiety Attack       Lamotrigine Rash     Lithium Carbonate      Paliperidone Other (See Comments)     felt drugged , couldn't see eyes blurry  Drowsiness       Quetiapine      Annotation: Anxiety Attack       Ramelteon Swelling     Lips and tongue      Sertraline      Ta      Annotation: Anxiety       Trazodone Headache     Zaleplon      Annotation: Anxiety attacks       Zolpidem      Bupropion Anxiety     Geodon  [Ziprasidone Hcl] Anxiety     Methylphenidate Anxiety     Oxcarbazepine Anxiety        RISK BEHAVIOR & HEALTH HABITS  Self Breast Exam: yes.  Regular Exercise: yes.  Balanced diet: yes.  Seat Belt Use: yes  Calcium intake/Osteoporosis prevention: she is taking Vitamin D supplementation..    Guns: NO  Sun Screen: YES  Dental Care: YES    REVIEW OF SYSTEMS:  Complete head to toe review of systems is otherwise negative except as above.    OBJECTIVE:  VITAL SIGNS:    Vitals:    03/15/19 1055   BP: 106/62   Pulse: 79   Resp: 16   Temp: 97.8  F (36.6  C)   SpO2: 99%     GENERAL:  Patient alert, in no acute distress.  EYES: PERRLA. Extraoccular movements intact, pupils equal, reactive to light and accommodation.  Normal conjunctiva and lids.  Undilated fudoscopic exam normal, including normal size, appearance cup-to-disc ratio.  Normal posterior segments, including no exudates or hemorrhages.  ENT:  Hearing grossly normal.  Normal appearance to ears and nose.  Bilateral TM s, external canals, oropharynx normal. Normal lips, gums and teeth.  Normal nasal mucosa, septum and turbinates.  NECK:  Supple, without thyromegaly or mass.  RESP:  Clear to auscultation without crackles, wheezes or distress.  Normal respiratory effort.   CV:  Regular rate and rhythm without murmurs, rubs or gallops.  Normal carotid, abdominal aorta, femoral and pedal pulses.  No varicosities or edema.  ABDOMEN:  Soft, non-tender, without hepatosplenomegaly, masses, or hernias.   BREASTS:  Nontender, without masses, nipple discharge, erythema, or axillary adenopathy.    :    External genitalia:  Normal without lesions.  Urethra: Normal appearing  Vagina: Normal without discharge  Cervix: not due  Uterus:  Nontender, mobile, without masses  Ovaries: Normal without masses  LYMPHATIC: Normal  palpation of neck, groin and axilla..  No lymphadenopathy.  No bruising.  NEURO:  CN II-XII intact, motor & sensory function all intact.  DTR and reflexes normal.  PSYCHIATRIC:  Alert & oriented with normal mood and affect.  Good judgment and insight.  SKIN:  Normal inspection and palpation.  MUSCULOSKELETAL: Normal gait and station.  - Spine / Ribs / Pelvis: Normal inspection, ROM, stability and strength: Spine, Head, Neck, Upper and Lower Extremities.    ASSESSMENT & PLAN  Loyda was seen today for annual exam and medication refill.    Diagnoses and all orders for this visit:    Routine general medical examination at a health care facility  Patient is working towards a healthy lifestyle.  She loves her new job.  Reviewed family history for high risk screening.  Patient declines lab draws today.    Allergic rhinitis  -     fluticasone (FLONASE) 50 mcg/actuation nasal spray; SHAKE LIQUID AND USE 1 SPRAY IN EACH NOSTRIL DAILY  Patient needs a refill of her nasal spray.    Generalized anxiety disorder  Patient's medical history updated to reflect the fact that she no longer has a diagnosis of bipolar disorder, but instead a diagnosis of generalized anxiety disorder per her workup at [place].

## 2021-08-19 ENCOUNTER — MYC MEDICAL ADVICE (OUTPATIENT)
Dept: FAMILY MEDICINE | Facility: CLINIC | Age: 31
End: 2021-08-19

## 2021-08-19 DIAGNOSIS — R10.84 ABDOMINAL PAIN, GENERALIZED: Primary | ICD-10-CM

## 2021-08-25 ENCOUNTER — ANCILLARY PROCEDURE (OUTPATIENT)
Dept: CT IMAGING | Facility: CLINIC | Age: 31
End: 2021-08-25
Attending: FAMILY MEDICINE
Payer: COMMERCIAL

## 2021-08-25 DIAGNOSIS — R10.84 ABDOMINAL PAIN, GENERALIZED: ICD-10-CM

## 2021-08-25 PROCEDURE — 74177 CT ABD & PELVIS W/CONTRAST: CPT | Performed by: RADIOLOGY

## 2021-08-25 RX ORDER — IOPAMIDOL 755 MG/ML
108 INJECTION, SOLUTION INTRAVASCULAR ONCE
Status: COMPLETED | OUTPATIENT
Start: 2021-08-25 | End: 2021-08-25

## 2021-08-25 RX ADMIN — IOPAMIDOL 108 ML: 755 INJECTION, SOLUTION INTRAVASCULAR at 07:33

## 2021-09-25 ENCOUNTER — HEALTH MAINTENANCE LETTER (OUTPATIENT)
Age: 31
End: 2021-09-25

## 2021-10-13 ENCOUNTER — TELEPHONE (OUTPATIENT)
Dept: FAMILY MEDICINE | Facility: CLINIC | Age: 31
End: 2021-10-13

## 2022-02-08 ENCOUNTER — TRANSFERRED RECORDS (OUTPATIENT)
Dept: HEALTH INFORMATION MANAGEMENT | Facility: CLINIC | Age: 32
End: 2022-02-08
Payer: COMMERCIAL

## 2022-03-12 ENCOUNTER — HEALTH MAINTENANCE LETTER (OUTPATIENT)
Age: 32
End: 2022-03-12

## 2022-04-01 ENCOUNTER — OFFICE VISIT (OUTPATIENT)
Dept: FAMILY MEDICINE | Facility: CLINIC | Age: 32
End: 2022-04-01
Payer: COMMERCIAL

## 2022-04-01 VITALS
OXYGEN SATURATION: 98 % | HEART RATE: 102 BPM | DIASTOLIC BLOOD PRESSURE: 80 MMHG | BODY MASS INDEX: 29.23 KG/M2 | HEIGHT: 64 IN | WEIGHT: 171.2 LBS | SYSTOLIC BLOOD PRESSURE: 100 MMHG

## 2022-04-01 DIAGNOSIS — L70.0 CYSTIC ACNE: ICD-10-CM

## 2022-04-01 DIAGNOSIS — Z13.1 SCREENING FOR DIABETES MELLITUS: ICD-10-CM

## 2022-04-01 DIAGNOSIS — Z00.00 ROUTINE GENERAL MEDICAL EXAMINATION AT A HEALTH CARE FACILITY: Primary | ICD-10-CM

## 2022-04-01 DIAGNOSIS — Z13.220 LIPID SCREENING: ICD-10-CM

## 2022-04-01 LAB
ANION GAP SERPL CALCULATED.3IONS-SCNC: 13 MMOL/L (ref 5–18)
BUN SERPL-MCNC: 15 MG/DL (ref 8–22)
CALCIUM SERPL-MCNC: 9.7 MG/DL (ref 8.5–10.5)
CHLORIDE BLD-SCNC: 102 MMOL/L (ref 98–107)
CHOLEST SERPL-MCNC: 178 MG/DL
CO2 SERPL-SCNC: 24 MMOL/L (ref 22–31)
CREAT SERPL-MCNC: 0.79 MG/DL (ref 0.6–1.1)
FASTING STATUS PATIENT QL REPORTED: YES
GFR SERPL CREATININE-BSD FRML MDRD: >90 ML/MIN/1.73M2
GLUCOSE BLD-MCNC: 92 MG/DL (ref 70–125)
HBA1C MFR BLD: 5.4 % (ref 0–5.6)
HDLC SERPL-MCNC: 47 MG/DL
LDLC SERPL CALC-MCNC: 103 MG/DL
POTASSIUM BLD-SCNC: 4.2 MMOL/L (ref 3.5–5)
SODIUM SERPL-SCNC: 139 MMOL/L (ref 136–145)
TRIGL SERPL-MCNC: 142 MG/DL

## 2022-04-01 PROCEDURE — 36415 COLL VENOUS BLD VENIPUNCTURE: CPT | Performed by: FAMILY MEDICINE

## 2022-04-01 PROCEDURE — 80061 LIPID PANEL: CPT | Performed by: FAMILY MEDICINE

## 2022-04-01 PROCEDURE — 80048 BASIC METABOLIC PNL TOTAL CA: CPT | Performed by: FAMILY MEDICINE

## 2022-04-01 PROCEDURE — 83036 HEMOGLOBIN GLYCOSYLATED A1C: CPT | Performed by: FAMILY MEDICINE

## 2022-04-01 PROCEDURE — 99395 PREV VISIT EST AGE 18-39: CPT | Performed by: FAMILY MEDICINE

## 2022-04-01 RX ORDER — DICYCLOMINE HYDROCHLORIDE 10 MG/ML
INJECTION INTRAMUSCULAR
COMMUNITY
End: 2022-04-01

## 2022-04-01 RX ORDER — DICYCLOMINE HYDROCHLORIDE 10 MG/1
10 CAPSULE ORAL DAILY PRN
COMMUNITY

## 2022-04-01 RX ORDER — SPIRONOLACTONE 50 MG/1
50 TABLET, FILM COATED ORAL
COMMUNITY
Start: 2021-09-23

## 2022-04-01 NOTE — PROGRESS NOTES
SUBJECTIVE:   CC: Loyda Liang is an 31 year old woman who presents for preventive health visit.       Patient has been advised of split billing requirements and indicates understanding: Yes  Healthy Habits:     Getting at least 3 servings of Calcium per day:  NO    Bi-annual eye exam:  Yes    Dental care twice a year:  Yes    Sleep apnea or symptoms of sleep apnea:  None    Diet:  Regular (no restrictions)    Frequency of exercise:  2-3 days/week    Duration of exercise:  15-30 minutes    Taking medications regularly:  Yes    Medication side effects:  None    PHQ-2 Total Score: 2    Additional concerns today:  No      Menses: regular, 3-4 days, improved on OCP.   Sexually Active: not for years  Contraception: OCP  Last Pap Smear: normal and negative  Abnormal Pap: none    Trying to do yoga, walking, and stretching.  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  Has more flexibility with her time.  Takes walk most days.  Works with great people.  Wakes up feeling good.  Making healthier choices.  Would like to start biking more.  Making a lot of soups.    Patient sees gastroenterology.  Patient takes bentyl once a day as needed for flares - she will take it for a couple of days in the morning.No issues for heartburn.  Gastroenterology is pretty sure that she does not have intestinal angioedema.    Still meeting with therapist once a week.  In 2017, went to [facility] for psychiatry, removed her bipolar diagnoses.  New diagnoses of Mood disorder, Anxiety, PTSD all NOS.  Took Lexapro for a little while, but did not like it.  Stopped taking it and feels better overall.    Today's PHQ-2 Score:   PHQ-2 ( 1999 Pfizer) 3/26/2022   Q1: Little interest or pleasure in doing things 1   Q2: Feeling down, depressed or hopeless 1   PHQ-2 Score 2   Q1: Little interest or pleasure in doing things Several days   Q2: Feeling down,  depressed or hopeless Several days   PHQ-2 Score 2       Abuse: Current or Past (Physical, Sexual or Emotional) - No  Do you feel safe in your environment? Yes    Have you ever done Advance Care Planning? (For example, a Health Directive, POLST, or a discussion with a medical provider or your loved ones about your wishes): Yes, patient states has an Advance Care Planning document and will bring a copy to the clinic.        Social History     Tobacco Use     Smoking status: Never Smoker     Smokeless tobacco: Never Used   Substance Use Topics     Alcohol use: Yes     Comment: Alcoholic Drinks/day: socially- rarely     If you drink alcohol do you typically have >3 drinks per day or >7 drinks per week? No    No flowsheet data found.    Reviewed orders with patient.  Reviewed health maintenance and updated orders accordingly - Yes  Labs reviewed in EPIC    Breast Cancer Screening:    FHS-7:   Breast CA Risk Assessment (FHS-7) 2/5/2022 2/5/2022 2/5/2022 3/26/2022   Did any of your first-degree relatives have breast or ovarian cancer? Yes Yes Yes No    This is actually a No.  Her PGM had breast cancer   Did any of your relatives have bilateral breast cancer? Unknown Unknown Unknown Unknown   Did any man in your family have breast cancer? No No No No   Did any woman in your family have breast and ovarian cancer? No No No No   Did any woman in your family have breast cancer before age 50 y? Yes Yes Yes Yes   Do you have 2 or more relatives with breast and/or ovarian cancer? No No No No   Do you have 2 or more relatives with breast and/or bowel cancer? No No No No       Patient under 40 years of age: Routine Mammogram Screening not recommended.   Pertinent mammograms are reviewed under the imaging tab.    History of abnormal Pap smear: NO - age 30-65 PAP every 5 years with negative HPV co-testing recommended  PAP / HPV Latest Ref Rng & Units 2/5/2021 2/26/2018 7/30/2015   PAP Negative for squamous intraepithelial lesion or  "malignancy. Negative for squamous intraepithelial lesion or malignancy  Electronically signed by Kimberlyn Flowers CT (ASCP) on 2/15/2021 at  3:04 PM   Negative for squamous intraepithelial lesion or malignancy  Electronically signed by Kimberlyn Flowers CT (ASCP) on 2/28/2018 at  2:44 PM   Negative for squamous intraepithelial lesion or malignancy  Electronically signed by Kimberlyn Flowers CT (ASCP) on 8/7/2015 at  1:56 PM     HPV16 NEG Negative - -   HPV18 NEG Negative - -   HRHPV NEG Negative - -     Reviewed and updated as needed this visit by clinical staff   Tobacco    Problems               Reviewed and updated as needed this visit by Provider      Problems              Past Medical History:   Diagnosis Date     Angioedema      Bipolar 1 disorder (H)      Bipolar disorder (H)       Past Surgical History:   Procedure Laterality Date     COLONOSCOPY       ESOPHAGOSCOPY, GASTROSCOPY, DUODENOSCOPY (EGD), COMBINED       MAMMOPLASTY REDUCTION Bilateral      MOUTH SURGERY         Review of Systems  CONSTITUTIONAL: NEGATIVE for fever, chills, change in weight  INTEGUMENTARU/SKIN: NEGATIVE for worrisome rashes, moles or lesions  EYES: NEGATIVE for vision changes or irritation  ENT: NEGATIVE for ear, mouth and throat problems  RESP: NEGATIVE for significant cough or SOB  BREAST: NEGATIVE for masses, tenderness or discharge  CV: NEGATIVE for chest pain, palpitations or peripheral edema  GI: NEGATIVE for nausea, abdominal pain, heartburn, or change in bowel habits  : NEGATIVE for unusual urinary or vaginal symptoms. Periods are regular.  MUSCULOSKELETAL: NEGATIVE for significant arthralgias or myalgia  NEURO: NEGATIVE for weakness, dizziness or paresthesias  PSYCHIATRIC: NEGATIVE for changes in mood or affect     OBJECTIVE:   /80 (BP Location: Left arm, Patient Position: Sitting, Cuff Size: Adult Large)   Pulse 102   Ht 1.613 m (5' 3.5\")   Wt 77.7 kg (171 lb 3.2 oz)   LMP 03/23/2022   SpO2 98%   BMI " 29.85 kg/m    Physical Exam  GENERAL: healthy, alert and no distress  EYES: Eyes grossly normal to inspection, PERRL and conjunctivae and sclerae normal  HENT: ear canals and TM's normal, nose and mouth without ulcers or lesions  NECK: no adenopathy, no asymmetry, masses, or scars and thyroid normal to palpation  RESP: lungs clear to auscultation - no rales, rhonchi or wheezes  BREAST: normal without masses, tenderness or nipple discharge and no palpable axillary masses or adenopathy  CV: regular rate and rhythm, normal S1 S2, no S3 or S4, no murmur, click or rub, no peripheral edema and peripheral pulses strong  ABDOMEN: soft, nontender, no hepatosplenomegaly, no masses and bowel sounds normal   (female): normal female external genitalia, normal urethral meatus, vaginal mucosa pink, moist, well rugated, and normal cervix/adnexa/uterus without masses or discharge  MS: no gross musculoskeletal defects noted, no edema  SKIN: no suspicious lesions or rashes  NEURO: Normal strength and tone, mentation intact and speech normal  PSYCH: mentation appears normal, affect normal/bright    Labs reviewed in Epic    ASSESSMENT/PLAN:   Loyda was seen today for physical and medication follow-up.    Diagnoses and all orders for this visit:    Routine general medical examination at a health care facility  Patient is doing well.  Focusing on being healthy.  She is doing a great job.  She has work that has meaning for her and that she is excited about.    Screening for diabetes mellitus  -     Hemoglobin A1c; Future  -     Hemoglobin A1c    Lipid screening  -     Lipid Profile (Chol, Trig, HDL, LDL calc); Future  -     Lipid Profile (Chol, Trig, HDL, LDL calc)    Cystic acne  -     Basic metabolic panel  (Ca, Cl, CO2, Creat, Gluc, K, Na, BUN); Future  -     Basic metabolic panel  (Ca, Cl, CO2, Creat, Gluc, K, Na, BUN)      COUNSELING:  Reviewed preventive health counseling, as reflected in patient instructions       Regular  "exercise       Healthy diet/nutrition       Contraception       Safe sex practices/STD prevention    Estimated body mass index is 29.85 kg/m  as calculated from the following:    Height as of this encounter: 1.613 m (5' 3.5\").    Weight as of this encounter: 77.7 kg (171 lb 3.2 oz).    Weight management plan: Patient has been focusing on healthier eating and outdoor activities.    She reports that she has never smoked. She has never used smokeless tobacco.      Counseling Resources:  ATP IV Guidelines  Pooled Cohorts Equation Calculator  Breast Cancer Risk Calculator  BRCA-Related Cancer Risk Assessment: FHS-7 Tool  FRAX Risk Assessment  ICSI Preventive Guidelines  Dietary Guidelines for Americans, 2010  USDA's MyPlate  ASA Prophylaxis  Lung CA Screening    Francheska Moran MD  [facility]  "

## 2022-05-23 ENCOUNTER — TRANSFERRED RECORDS (OUTPATIENT)
Dept: HEALTH INFORMATION MANAGEMENT | Facility: CLINIC | Age: 32
End: 2022-05-23
Payer: COMMERCIAL

## 2022-10-05 DIAGNOSIS — J30.2 SEASONAL ALLERGIC RHINITIS, UNSPECIFIED TRIGGER: ICD-10-CM

## 2022-10-05 RX ORDER — FLUTICASONE PROPIONATE 50 MCG
SPRAY, SUSPENSION (ML) NASAL
Qty: 48 G | Refills: 2 | Status: SHIPPED | OUTPATIENT
Start: 2022-10-05

## 2022-10-05 NOTE — TELEPHONE ENCOUNTER
"Last Written Prescription Date:  2/9/22  Last Fill Quantity: 18.2 ml,  # refills: 3   Last office visit provider:  4/1/22     Requested Prescriptions   Pending Prescriptions Disp Refills     fluticasone (FLONASE) 50 MCG/ACT nasal spray [Pharmacy Med Name: FLUTICASONE 50MCG NASAL SP (120) RX] 16 g      Sig: SHAKE LIQUID AND USE 1 SPRAY IN EACH NOSTRIL DAILY       Nasal Allergy Protocol Passed - 10/5/2022  1:31 PM        Passed - Patient is age 12 or older        Passed - Recent (12 mo) or future (30 days) visit within the authorizing provider's specialty     Patient has had an office visit with the authorizing provider or a provider within the authorizing providers department within the previous 12 mos or has a future within next 30 days. See \"Patient Info\" tab in inbasket, or \"Choose Columns\" in Meds & Orders section of the refill encounter.              Passed - Medication is active on med list             Romain Austin RN 10/05/22 1:31 PM  "

## 2023-03-17 DIAGNOSIS — Z30.41 ENCOUNTER FOR SURVEILLANCE OF CONTRACEPTIVE PILLS: ICD-10-CM

## 2023-03-17 RX ORDER — LEVONORGESTREL/ETHIN.ESTRADIOL 0.1-0.02MG
1 TABLET ORAL DAILY
Qty: 90 TABLET | Refills: 0 | Status: SHIPPED | OUTPATIENT
Start: 2023-03-17 | End: 2023-06-12

## 2023-03-17 NOTE — TELEPHONE ENCOUNTER
"    Last Written Prescription Date:  2/9/2022  Last Fill Quantity: 90,  # refills: 3   Last office visit provider:  4/1/2022     Requested Prescriptions   Pending Prescriptions Disp Refills     levonorgestrel-ethinyl estradiol (AVIANE) 0.1-20 MG-MCG tablet 90 tablet 3     Sig: Take 1 tablet by mouth daily       Contraceptives Protocol Failed - 3/17/2023  9:50 AM        Failed - Recent (12 mo) or future (30 days) visit within the authorizing provider's specialty     Patient has had an office visit with the authorizing provider or a provider within the authorizing providers department within the previous 12 mos or has a future within next 30 days. See \"Patient Info\" tab in inbasket, or \"Choose Columns\" in Meds & Orders section of the refill encounter.              Passed - Patient is not a current smoker if age is 35 or older        Passed - Medication is active on med list        Passed - No active pregnancy on record        Passed - No positive pregnancy test in past 12 months             Elida Condon RN 03/17/23 2:49 PM  "

## 2023-04-21 ENCOUNTER — TRANSFERRED RECORDS (OUTPATIENT)
Dept: HEALTH INFORMATION MANAGEMENT | Facility: CLINIC | Age: 33
End: 2023-04-21
Payer: COMMERCIAL

## 2023-04-21 LAB — PHQ9 SCORE: 22

## 2023-04-22 ENCOUNTER — HEALTH MAINTENANCE LETTER (OUTPATIENT)
Age: 33
End: 2023-04-22

## 2023-04-24 ENCOUNTER — TRANSCRIBE ORDERS (OUTPATIENT)
Dept: OTHER | Age: 33
End: 2023-04-24

## 2023-04-24 ENCOUNTER — MEDICAL CORRESPONDENCE (OUTPATIENT)
Dept: HEALTH INFORMATION MANAGEMENT | Facility: CLINIC | Age: 33
End: 2023-04-24
Payer: COMMERCIAL

## 2023-04-24 DIAGNOSIS — L60.0 INGROWN TOENAIL OF RIGHT FOOT: Primary | ICD-10-CM

## 2023-05-10 ENCOUNTER — OFFICE VISIT (OUTPATIENT)
Dept: PODIATRY | Facility: CLINIC | Age: 33
End: 2023-05-10
Payer: COMMERCIAL

## 2023-05-10 VITALS
DIASTOLIC BLOOD PRESSURE: 80 MMHG | HEART RATE: 90 BPM | SYSTOLIC BLOOD PRESSURE: 122 MMHG | BODY MASS INDEX: 30.33 KG/M2 | HEIGHT: 63 IN

## 2023-05-10 DIAGNOSIS — L60.0 INGROWN TOENAIL OF RIGHT FOOT: ICD-10-CM

## 2023-05-10 DIAGNOSIS — M21.619 BUNION: Primary | ICD-10-CM

## 2023-05-10 PROCEDURE — 99243 OFF/OP CNSLTJ NEW/EST LOW 30: CPT | Performed by: PODIATRIST

## 2023-05-10 NOTE — PROGRESS NOTES
Subjective:    Pt is seen today in consult from Dr. Moran w/ the c/c of a painful ingrown right and left great nail lateral borders left>R.  She points to proximal lateral nail borders as to where most of her pain is.  Occasionally medial borders are problematic but typically it is the lateral that is most bothersome.  This has been problematic for many years..   negativehistory of drainage from the site.  Aggravated by activity and relieved by rest.  Has tried soaking which has not helped.   denies history of trauma to the area.  She is currently not working.  When she does work she is working out of her house often barefoot.    ROS:  see above         Allergies   Allergen Reactions     Abilify [Aripiprazole]      Adderall      Buspar [Buspirone Hcl]      Concerta [Methylphenidate Hcl]      Eszopiclone      Geodon [Ziprasidone Hydrochloride]      Lamictal [Lamotrigine]      Oxcarbazepine      Paliperidone      Prozac [Fluoxetine Hcl]      Seroquel [Quetiapine Fumarate]      Strattera [Atomoxetine Hydrochloride]      Trazodone      Wellbutrin [Bupropion Hcl]      Zoloft        Current Outpatient Medications   Medication Sig Dispense Refill     dicyclomine (BENTYL) 10 MG capsule Take 10 mg by mouth daily as needed       fluticasone (FLONASE) 50 MCG/ACT nasal spray SHAKE LIQUID AND USE 1 SPRAY IN EACH NOSTRIL DAILY 48 g 2     levonorgestrel-ethinyl estradiol (AVIANE) 0.1-20 MG-MCG tablet Take 1 tablet by mouth daily 90 tablet 0     OMEGA 3 1000 MG OR CAPS 3000 mg. daily       PROBIOTICA OR CHEW None Entered       spironolactone (ALDACTONE) 50 MG tablet Take 50 mg by mouth         Patient Active Problem List   Diagnosis     Allergic Rhinitis     Acne     Irregular Length Of Menstrual Periods     External Hemorrhoids With Bleeding     Perioral Dermatitis     Abnormal Weight Gain     Midback Pain     Neck Pain     Lower Back Pain     Polyarthritis Of Multiple Sites     Reflux esophagitis     Unspecified vitamin D deficiency  "      Past Medical History:   Diagnosis Date     Angioedema      Bipolar 1 disorder (H)      Bipolar disorder (H)        Past Surgical History:   Procedure Laterality Date     COLONOSCOPY       ESOPHAGOSCOPY, GASTROSCOPY, DUODENOSCOPY (EGD), COMBINED       MAMMOPLASTY REDUCTION Bilateral      MOUTH SURGERY         Family History   Problem Relation Age of Onset     Hyperlipidemia Mother      Hypertension Mother      Hyperlipidemia Father      Hypertension Father      Cerebrovascular Disease Father      Multiple myeloma Maternal Grandmother      Heart Disease Maternal Grandmother         multiple stents     Early Death Paternal Grandfather         when father was 15 of brain aneurysm     Cerebrovascular Disease Paternal Grandfather      Breast Cancer Paternal Grandmother         unclear age, when she was young.       Colon Cancer No family hx of      Clotting Disorder No family hx of        Social History     Tobacco Use     Smoking status: Never     Smokeless tobacco: Never   Vaping Use     Vaping status: Not on file   Substance Use Topics     Alcohol use: Yes     Comment: Alcoholic Drinks/day: socially- rarely         Exam:    Vitals: /80   Pulse 90   Ht 1.6 m (5' 3\")   BMI 30.33 kg/m    BMI: Body mass index is 30.33 kg/m .  Height: 5' 3\"    Constitutional/ general:  Pt is in no apparent distress, appears well-nourished.  Cooperative with history and physical exam.     Psych:  The patient answered questions appropriately.  Normal affect.  Seems to have reasonable expectations, in terms of treatment.     Lungs:  Non labored breathing, non labored speech. No cough.  No audible wheezing. Even, quiet breathing.       Vascular:  positive pedal pulses bilaterally for both the DP and PT arteries.  CFT < 3 sec.  negative ankle edema.  positive pedal hair growth.    Neuro:  Alert and oriented x 3. Coordinated gait.  Light touch sensation is intact     Derm: Normal texture and turgor.  No erythema, ecchymosis, or " cyanosis.      Musculoskeletal:   Patient has somewhat of a long hallux especially on the right.  The left has a bunion deformity.  Right and left great toe naillateral border shows soft tissue impingement with localized erythema on the proximal nail border in the distal borders look normal.  negative active drainage/purulence at this time.  No sinus tracts.  No nailbed masses or exostosis.  No pain with range of motion of IPJ or MTPJ.  No ascending cellulitis.    ASSESSMENT:    Onychocryptosis with paronychia aforementioned toe.  Left bunion    Discussed etiology and treatment options in detail w/ the patient.  The potential causes and nature of an ingrown toenail were discussed with the patient.  We reviewed the natural history/prognosis of the condition and potential risks if no treatment is provided.      After thorough discussion and answering all questions, the patient will try tennis shoe with a large toe box to keep hallux offloaded especially in light of her long hallux and bunion.  I made suggestions on shoes.  We dispensed a toe .  We discussed permanent removal of lateral nail borders.  Discussed this would be the next step in treating this.  She would like to try conservative treatments first.  Return to clinic prn.  Thank you for allowing me participate in the care of this patient.        Thomas Fiore, DIGNA, FACFAS

## 2023-05-10 NOTE — LETTER
5/10/2023         RE: Loyda Liang  [address]  [facility] [address]        Dear Colleague,    Thank you for referring your patient, Loyda Liang, to the [facility]. Please see a copy of my visit note below.    Subjective:    Pt is seen today in consult from Dr. Moran w/ the c/c of a painful ingrown right and left great nail lateral borders left>R.  She points to proximal lateral nail borders as to where most of her pain is.  Occasionally medial borders are problematic but typically it is the lateral that is most bothersome.  This has been problematic for many years..   negativehistory of drainage from the site.  Aggravated by activity and relieved by rest.  Has tried soaking which has not helped.   denies history of trauma to the area.  She is currently not working.  When she does work she is working out of her house often barefoot.    ROS:  see above         Allergies   Allergen Reactions     Abilify [Aripiprazole]      Adderall      Buspar [Buspirone Hcl]      Concerta [Methylphenidate Hcl]      Eszopiclone      Geodon [Ziprasidone Hydrochloride]      Lamictal [Lamotrigine]      Oxcarbazepine      Paliperidone      Prozac [Fluoxetine Hcl]      Seroquel [Quetiapine Fumarate]      Strattera [Atomoxetine Hydrochloride]      Trazodone      Wellbutrin [Bupropion Hcl]      Zoloft        Current Outpatient Medications   Medication Sig Dispense Refill     dicyclomine (BENTYL) 10 MG capsule Take 10 mg by mouth daily as needed       fluticasone (FLONASE) 50 MCG/ACT nasal spray SHAKE LIQUID AND USE 1 SPRAY IN EACH NOSTRIL DAILY 48 g 2     levonorgestrel-ethinyl estradiol (AVIANE) 0.1-20 MG-MCG tablet Take 1 tablet by mouth daily 90 tablet 0     OMEGA 3 1000 MG OR CAPS 3000 mg. daily       PROBIOTICA OR CHEW None Entered       spironolactone (ALDACTONE) 50 MG tablet Take 50 mg by mouth         Patient Active Problem List   Diagnosis     Allergic Rhinitis     Acne     Irregular Length  "Of Menstrual Periods     External Hemorrhoids With Bleeding     Perioral Dermatitis     Abnormal Weight Gain     Midback Pain     Neck Pain     Lower Back Pain     Polyarthritis Of Multiple Sites     Reflux esophagitis     Unspecified vitamin D deficiency       Past Medical History:   Diagnosis Date     Angioedema      Bipolar 1 disorder (H)      Bipolar disorder (H)        Past Surgical History:   Procedure Laterality Date     COLONOSCOPY       ESOPHAGOSCOPY, GASTROSCOPY, DUODENOSCOPY (EGD), COMBINED       MAMMOPLASTY REDUCTION Bilateral      MOUTH SURGERY         Family History   Problem Relation Age of Onset     Hyperlipidemia Mother      Hypertension Mother      Hyperlipidemia Father      Hypertension Father      Cerebrovascular Disease Father      Multiple myeloma Maternal Grandmother      Heart Disease Maternal Grandmother         multiple stents     Early Death Paternal Grandfather         when father was 15 of brain aneurysm     Cerebrovascular Disease Paternal Grandfather      Breast Cancer Paternal Grandmother         unclear age, when she was young.       Colon Cancer No family hx of      Clotting Disorder No family hx of        Social History     Tobacco Use     Smoking status: Never     Smokeless tobacco: Never   Vaping Use     Vaping status: Not on file   Substance Use Topics     Alcohol use: Yes     Comment: Alcoholic Drinks/day: socially- rarely         Exam:    Vitals: /80   Pulse 90   Ht 1.6 m (5' 3\")   BMI 30.33 kg/m    BMI: Body mass index is 30.33 kg/m .  Height: 5' 3\"    Constitutional/ general:  Pt is in no apparent distress, appears well-nourished.  Cooperative with history and physical exam.     Psych:  The patient answered questions appropriately.  Normal affect.  Seems to have reasonable expectations, in terms of treatment.     Lungs:  Non labored breathing, non labored speech. No cough.  No audible wheezing. Even, quiet breathing.       Vascular:  positive pedal pulses " bilaterally for both the DP and PT arteries.  CFT < 3 sec.  negative ankle edema.  positive pedal hair growth.    Neuro:  Alert and oriented x 3. Coordinated gait.  Light touch sensation is intact     Derm: Normal texture and turgor.  No erythema, ecchymosis, or cyanosis.      Musculoskeletal:   Patient has somewhat of a long hallux especially on the right.  The left has a bunion deformity.  Right and left great toe naillateral border shows soft tissue impingement with localized erythema on the proximal nail border in the distal borders look normal.  negative active drainage/purulence at this time.  No sinus tracts.  No nailbed masses or exostosis.  No pain with range of motion of IPJ or MTPJ.  No ascending cellulitis.    ASSESSMENT:    Onychocryptosis with paronychia aforementioned toe.  Left bunion    Discussed etiology and treatment options in detail w/ the patient.  The potential causes and nature of an ingrown toenail were discussed with the patient.  We reviewed the natural history/prognosis of the condition and potential risks if no treatment is provided.      After thorough discussion and answering all questions, the patient will try tennis shoe with a large toe box to keep hallux offloaded especially in light of her long hallux and bunion.  I made suggestions on shoes.  We dispensed a toe .  We discussed permanent removal of lateral nail borders.  Discussed this would be the next step in treating this.  She would like to try conservative treatments first.  Return to clinic prn.  Thank you for allowing me participate in the care of this patient.        Thomas Fiore DPM, FACFAS        Again, thank you for allowing me to participate in the care of your patient.        Sincerely,        Thomas Fiore DPM

## 2023-05-10 NOTE — PATIENT INSTRUCTIONS
We wish you continued good healing. If you have any questions or concerns, please do not hesitate to contact us at  [phone]    [facility] (secure e-mail communication and access to your chart) to send a message or to make an appointment.    Please remember to call and schedule a follow up appointment if one was recommended at your earliest convenience.     PODIATRY CLINIC HOURS  TELEPHONE NUMBER    Dr. Thomas FRENCHPILSA [facility]        Clinics:  Clemente Werner  [facility], [place], [facility]  Tuesday 1PM-6PM  [facility]  Wednesday 745AM-330PM  [place]  Thursday/Friday 745AM-230PM  [place]   1st and 3rd Mondays  845-430 PM   [place] APPOINTMENTS  [phone]    [place] APPOINTMENTS  [phone]    [place] APPOINTMENTS  [phone]        If you need a medication refill, please contact us you may need lab work and/or a follow up visit prior to your refill (i.e. Antifungal medications).  If MRI needed please call Imaging at [phone]   HOW DO I GET MY KNEE SCOOTER? Knee scooters can be picked up at ANY Medical Supply stores with your knee scooter Prescription.  OR  Bring your signed prescription to an [facility] Medical Equipment showroom.  Call or bring in your Orthotics order to any Orthotics locations. Or call [phone]

## 2023-06-02 ENCOUNTER — HEALTH MAINTENANCE LETTER (OUTPATIENT)
Age: 33
End: 2023-06-02

## 2023-06-10 DIAGNOSIS — Z30.41 ENCOUNTER FOR SURVEILLANCE OF CONTRACEPTIVE PILLS: ICD-10-CM

## 2023-06-11 NOTE — TELEPHONE ENCOUNTER
"Former patient of Dr Francheska Moran & has not established care with another provider.  Please assign refill request to covering provider per clinic standard process.    Routing refill request to provider for review/approval because:  Patient needs to be seen because it has been more than 1 year since last office visit.    Last Written Prescription Date:  3/17/2023  Last Fill Quantity: 90,  # refills: 0   Last office visit provider:  4/1/2022     Requested Prescriptions   Pending Prescriptions Disp Refills     VIENVA 0.1-20 MG-MCG tablet [Pharmacy Med Name: VIENVA 0.1MG/0.02MG TABS 28S] 84 tablet      Sig: TAKE 1 TABLET BY MOUTH DAILY       Contraceptives Protocol Failed - 6/11/2023 12:14 PM        Failed - Recent (12 mo) or future (30 days) visit within the authorizing provider's specialty     Patient has had an office visit with the authorizing provider or a provider within the authorizing providers department within the previous 12 mos or has a future within next 30 days. See \"Patient Info\" tab in inbasket, or \"Choose Columns\" in Meds & Orders section of the refill encounter.              Passed - Patient is not a current smoker if age is 35 or older        Passed - Medication is active on med list        Passed - No active pregnancy on record        Passed - No positive pregnancy test in past 12 months             Dennise Bravo RN 06/11/23 12:15 PM  "

## 2023-06-12 RX ORDER — TIMOLOL MALEATE 5 MG/ML
SOLUTION/ DROPS OPHTHALMIC
Qty: 84 TABLET | Refills: 3 | Status: SHIPPED | OUTPATIENT
Start: 2023-06-12

## 2024-03-09 DIAGNOSIS — J30.2 SEASONAL ALLERGIC RHINITIS, UNSPECIFIED TRIGGER: ICD-10-CM

## 2024-03-11 RX ORDER — FLUTICASONE PROPIONATE 50 MCG
SPRAY, SUSPENSION (ML) NASAL
Qty: 48 G | Refills: 2 | OUTPATIENT
Start: 2024-03-11

## 2024-06-18 ENCOUNTER — TRANSFERRED RECORDS (OUTPATIENT)
Dept: HEALTH INFORMATION MANAGEMENT | Facility: CLINIC | Age: 34
End: 2024-06-18
Payer: COMMERCIAL

## 2024-06-18 LAB
CHOLESTEROL (EXTERNAL): 217 MG/DL
HBA1C MFR BLD: 5.6 %
HDLC SERPL-MCNC: 55 MG/DL
LDL CHOLESTEROL CALCULATED (EXTERNAL): 128 MG/DL
NON HDL CHOLESTEROL (EXTERNAL): 162 MG/DL
TRIGLYCERIDES (EXTERNAL): 195 MG/DL

## 2024-06-19 LAB — TSH SERPL-ACNC: 2.11 UIU/ML (ref 0.45–5.33)

## 2024-06-20 ENCOUNTER — TRANSCRIBE ORDERS (OUTPATIENT)
Dept: OTHER | Age: 34
End: 2024-06-20

## 2024-06-20 ENCOUNTER — MEDICAL CORRESPONDENCE (OUTPATIENT)
Dept: HEALTH INFORMATION MANAGEMENT | Facility: CLINIC | Age: 34
End: 2024-06-20
Payer: COMMERCIAL

## 2024-06-29 ENCOUNTER — HEALTH MAINTENANCE LETTER (OUTPATIENT)
Age: 34
End: 2024-06-29

## 2024-07-09 ENCOUNTER — TELEPHONE (OUTPATIENT)
Dept: ENDOCRINOLOGY | Facility: CLINIC | Age: 34
End: 2024-07-09
Payer: COMMERCIAL

## 2024-07-09 NOTE — TELEPHONE ENCOUNTER
Sent HeyStakshart (1st Attempt) for the patient to call back and schedule the following:    Appointment type: NEW [facility]   Provider:   Return date: 11/12/2024  Specialty phone number: [phone]  Additional appointment(s) needed: yes  Additonal Notes: Provider unavailable, pt needs to reschedule, pt will also need to reschedule Dietician appointment ( Can offer VV for , may be able to get in sooner )

## 2024-07-12 ENCOUNTER — TELEPHONE (OUTPATIENT)
Dept: ENDOCRINOLOGY | Facility: CLINIC | Age: 34
End: 2024-07-12
Payer: COMMERCIAL

## 2024-07-12 NOTE — TELEPHONE ENCOUNTER
Left Voicemail (2nd Attempt) and Sent Mychart (2nd Attempt) for the patient to call back and schedule the following:    Appointment type: NEW [facility]  Provider: Dr Mcghee  Return date: Your 11/12/24 appt has been changed from 12 pm to 10:30 am. Call if need to reschedule.  Specialty phone number: [phone]  Additional appointment(s) needed: n/a  Additonal Notes: appt at 12 pm scheduled incorrectly

## 2024-10-28 ENCOUNTER — TELEPHONE (OUTPATIENT)
Dept: ENDOCRINOLOGY | Facility: CLINIC | Age: 34
End: 2024-10-28
Payer: COMMERCIAL

## 2024-10-28 NOTE — TELEPHONE ENCOUNTER
Left Voicemail (1st Attempt) and Sent Mychart (1st Attempt) for the patient to call back and reschedule the following:    Appointment type: New [facility]  Appointment mode: Virtual Visit  Provider(s): An Araujo  Date: 11/14/24  Specialty phone number: [phone]    Additonal Notes: Can be rescheduled with any of the following providers: Jyothi Varela, An Araujo, or Janeth Sheets

## 2024-10-30 NOTE — TELEPHONE ENCOUNTER
Left Voicemail (2nd Attempt) for the patient to call back and reschedule the following:    Appointment type: New [facility]  Appointment mode: Virtual Visit  Provider(s): An Araujo  Date: 11/14/24  Specialty phone number: [phone]     Additonal Notes: Can be rescheduled with any of the following providers: Jyothi Varela, An Araujo, or Janeth Sheets

## 2025-03-06 ASSESSMENT — SLEEP AND FATIGUE QUESTIONNAIRES
HOW LIKELY ARE YOU TO NOD OFF OR FALL ASLEEP WHILE LYING DOWN TO REST IN THE AFTERNOON WHEN CIRCUMSTANCES PERMIT: MODERATE CHANCE OF DOZING
HOW LIKELY ARE YOU TO NOD OFF OR FALL ASLEEP WHILE SITTING AND READING: WOULD NEVER DOZE
HOW LIKELY ARE YOU TO NOD OFF OR FALL ASLEEP WHILE WATCHING TV: WOULD NEVER DOZE
HOW LIKELY ARE YOU TO NOD OFF OR FALL ASLEEP IN A CAR, WHILE STOPPED FOR A FEW MINUTES IN TRAFFIC: WOULD NEVER DOZE
HOW LIKELY ARE YOU TO NOD OFF OR FALL ASLEEP WHEN YOU ARE A PASSENGER IN A CAR FOR AN HOUR WITHOUT A BREAK: SLIGHT CHANCE OF DOZING
HOW LIKELY ARE YOU TO NOD OFF OR FALL ASLEEP WHILE SITTING AND TALKING TO SOMEONE: WOULD NEVER DOZE
HOW LIKELY ARE YOU TO NOD OFF OR FALL ASLEEP WHILE SITTING INACTIVE IN A PUBLIC PLACE: WOULD NEVER DOZE
HOW LIKELY ARE YOU TO NOD OFF OR FALL ASLEEP WHILE SITTING QUIETLY AFTER LUNCH WITHOUT ALCOHOL: WOULD NEVER DOZE

## 2025-03-10 ASSESSMENT — PATIENT HEALTH QUESTIONNAIRE - PHQ9
SUM OF ALL RESPONSES TO PHQ QUESTIONS 1-9: 11
10. IF YOU CHECKED OFF ANY PROBLEMS, HOW DIFFICULT HAVE THESE PROBLEMS MADE IT FOR YOU TO DO YOUR WORK, TAKE CARE OF THINGS AT HOME, OR GET ALONG WITH OTHER PEOPLE: SOMEWHAT DIFFICULT
SUM OF ALL RESPONSES TO PHQ QUESTIONS 1-9: 11

## 2025-03-11 ENCOUNTER — OFFICE VISIT (OUTPATIENT)
Dept: ENDOCRINOLOGY | Facility: CLINIC | Age: 35
End: 2025-03-11
Attending: FAMILY MEDICINE
Payer: COMMERCIAL

## 2025-03-11 VITALS
BODY MASS INDEX: 33.84 KG/M2 | SYSTOLIC BLOOD PRESSURE: 138 MMHG | OXYGEN SATURATION: 100 % | HEART RATE: 112 BPM | HEIGHT: 63 IN | DIASTOLIC BLOOD PRESSURE: 81 MMHG | WEIGHT: 191 LBS

## 2025-03-11 PROCEDURE — 1126F AMNT PAIN NOTED NONE PRSNT: CPT | Performed by: INTERNAL MEDICINE

## 2025-03-11 PROCEDURE — G2211 COMPLEX E/M VISIT ADD ON: HCPCS | Performed by: INTERNAL MEDICINE

## 2025-03-11 PROCEDURE — 99203 OFFICE O/P NEW LOW 30 MIN: CPT | Performed by: INTERNAL MEDICINE

## 2025-03-11 PROCEDURE — 3079F DIAST BP 80-89 MM HG: CPT | Performed by: INTERNAL MEDICINE

## 2025-03-11 PROCEDURE — 3075F SYST BP GE 130 - 139MM HG: CPT | Performed by: INTERNAL MEDICINE

## 2025-03-11 RX ORDER — HYDROCORTISONE 25 MG/G
CREAM TOPICAL
COMMUNITY

## 2025-03-11 ASSESSMENT — PAIN SCALES - GENERAL: PAINLEVEL_OUTOF10: NO PAIN (0)

## 2025-03-11 NOTE — NURSING NOTE
"(   Chief Complaint   Patient presents with    New Patient     New [facility]    )    ( Weight: 86.6 kg (191 lb) )  ( Height: 160 cm (5' 3\") )  ( BMI (Calculated): 33.83 )  (   )  ( Cumulative weight loss (lbs): 0 )  (   )  (   )  ( Waist Circumference (cm): 106 cm )  (   )    ( BP: 138/81 )  (   )  (   )  (   )  ( Pulse: 112 )  (   )  ( SpO2: 100 % )    (   Patient Active Problem List   Diagnosis    Allergic Rhinitis    Acne    Irregular Length Of Menstrual Periods    External Hemorrhoids With Bleeding    Perioral Dermatitis    Abnormal Weight Gain    Midback Pain    Neck Pain    Lower Back Pain    Polyarthritis Of Multiple Sites    Reflux esophagitis    Unspecified vitamin D deficiency    )  (   Current Outpatient Medications   Medication Sig Dispense Refill    fluticasone (FLONASE) 50 MCG/ACT nasal spray SHAKE LIQUID AND USE 1 SPRAY IN EACH NOSTRIL DAILY 48 g 2    OMEGA 3 1000 MG OR CAPS 3000 mg. daily      PROBIOTICA OR CHEW None Entered      spironolactone (ALDACTONE) 50 MG tablet Take 50 mg by mouth      VIENVA 0.1-20 MG-MCG tablet TAKE 1 TABLET BY MOUTH DAILY 84 tablet 3    dicyclomine (BENTYL) 10 MG capsule Take 10 mg by mouth daily as needed      hydrocortisone 2.5 % cream APPLY THIN LAYER TOPICALLY TO THE AFFECTED AREA OF FACE 1 TO 2 TIMES A DAY AS NEEDED. AVOID TO THE EYELIDS      )  ( Diabetes Eval:    )    ( Pain Eval:  No Pain (0) )    ( Wound Eval:       )    (   History   Smoking Status    Never   Smokeless Tobacco    Never    )    ( Signed By:  Cande Denney, EMT March 11, 2025; 9:08 AM )    "
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
## 2025-03-11 NOTE — PROGRESS NOTES
"Video-Visit Details    Type of service:  Video Visit    Video Start Time:  1:00 PM  Video End Time:  1:29 PM     Originating Location (pt. Location): Home    Distant Location (provider location):  Offsite (providers home) [facility] [place]     Platform used for Video Visit: Spor    New Weight Management Nutrition Consultation    Loyda Liang is a 34 year old female presents today for new weight management nutrition consultation.  Patient referred by Dr. Mcghee on March 11, 2025.    Patient with Co-morbidities of obesity including:      3/6/2025     9:40 AM   --   I have the following health issues associated with obesity None of the above   I have the following symptoms associated with obesity Depression    Back Pain    Fatigue    Hip Pain     Anthropometrics:  Initial consult weight: 191 lb on 3/11/25   Estimated body mass index is 33.83 kg/m  as calculated from the following:    Height as of 3/11/25: 1.6 m (5' 3\").    Weight as of 3/11/25: 86.6 kg (191 lb).    Medications for Weight Loss:  None currently-per provider, pt tried Metformin for ~3 months but did not help with weight loss. Had a discussion around various medications.     Does not have any specific goals for our visit today. Overall goals is to lose weight to help improve her overall health and be more spry.     NUTRITION HISTORY  Food allergies: NKFA  Food intolerances: Didn't discuss   Vitamin/Mineral Supplements: Omega 3   Previous methods of diet modification for weight loss: Metformin however pt reported that medication did not help.     RD before: No    Pt was on different mental health medications but stopped per pt stating \"they were not working\". While on medications, had gained 30 lbs and not been able to lose excess weight. Has high stress levels.     Tries to have a balanced meal. Does not eat much fast food. Tries to not eat many foods that are high in sugar.     Diet recall:   Per provider note pt is " not eating a lot only eats about 1-2 meals per day.  Does not feeling hungry a lot and tries to eat veggie daily. Currently reported getting meals through [employer].     Some days where she won't eat at all due to some stomach problems.     Lives alone. Loves chicken and fish. Has tried whole 30 in the past. Has tried tracking in the past but finds that she is not able to be consistent.     Breakfast - favorite meal of the day but will typically have eggs, vegetable fried rice with Just Eggs brand product. If this meal is later in the morning she will not have a lunch   Lunch - [employer] meal OR leftovers   Dinner -  Chicken/fish   Snacks - cooked artichokes in a pouch, veggies. Maybe will have popcorn, rice crackers.   Hydration: Does not drink soda or alcohol, coffee in the morning on occasion         3/6/2025     9:40 AM   Diet Recall Review with Patient   If you do eat breakfast, what types of food do you eat? Eggs, egg fried rice, sometimes leftovers, it sort of depends   If you do eat lunch, what types of food do you typically eat? Wide variety, I'm getting meals through [employer] right now so I'll generally eat one of those, or maybe leftovers. Sometimes salad, it sort of depends   If you do eat supper, what types of food do you typically eat? Wide variety, I'm getting meals through [employer] right now so I'll generally eat one of those, or maybe leftovers. Sometimes salad, it sort of depends   If you do snack, what types of food do you typically eat? I don't eat a ton of snacks, I like crunchy veggies, I like tomatoes, if I'm going to have chips I'll have some cottage cheese with it, sometimes other snack cheese, occasionally a lentil cup or instant ramen   How many glasses of juice do you drink in a typical day? 0   How many of glasses of milk do you drink in a typical day? 0   How many 8oz glasses of sugar containing drinks such as Hipolito-Aid/sweet tea do you drink in a day? 0   How many cans/bottles of  sugar pop/soda/tea/sports drinks do you drink in a day? 0   How many cans/bottles of diet pop/soda/tea or sports drink do you drink in a day? 0   How often do you have a drink of alcohol? Never           3/6/2025     9:40 AM   Eating Habits   Generally, my meals include foods like these bread, pasta, rice, potatoes, corn, crackers, sweet dessert, pop, or juice Almost Everyday   Generally, my meals include foods like these fried meats, brats, burgers, french fries, pizza, cheese, chips, or ice cream Once a Week   Eat fast food (like McDonalds, Burger William, Taco Bell) Less Than Weekly   Eat at a buffet or sit-down restaurant Less Than Weekly   Eat most of my meals in front of the TV or computer Almost Everyday   Often skip meals, eat at random times, have no regular eating times A Few Times a Week   Rarely sit down for a meal but snack or graze throughout Less Than Weekly   Eat extra snacks between meals Less Than Weekly   Eat most of my food at the end of the day Less Than Weekly   Eat in the middle of the night or wake up at night to eat Less Than Weekly   Eat extra snacks to prevent or correct low blood sugar Never   Eat to prevent acid reflux or stomach pain Once a Week   Worry about not having enough food to eat Never   I eat when I am depressed A Few Times a Week   I eat when I am stressed A Few Times a Week   I eat when I am bored Once a Week   I eat when I am anxious Less Than Weekly   I eat when I am happy or as a reward Almost Everyday   I feel hungry all the time even if I just have eaten Less Than Weekly   Feeling full is important to me Almost Everyday   I finish all the food on my plate even if I am already full A Few Times a Week   I can't resist eating delicious food or walk past the good food/smell Less Than Weekly   I eat/snack without noticing that I am eating Less Than Weekly   I eat when I am preparing the meal Less Than Weekly   I eat more than usual when I see others eating Never   I have  trouble not eating sweets, ice cream, cookies, or chips if they are around the house Less Than Weekly   I think about food all day Less Than Weekly   What foods, if any, do you crave? Cheese   Please list any other foods you crave? My cravings change/fluctuate. I don't always crave one thing, sometimes it has to do with my cycle, or how I'm feeling emotionally. It just depends.           3/6/2025     9:40 AM   Amount of Food   I feel out of control when eating Never   I eat a large amount of food, like a loaf of bread, a box of cookies, a pint/quart of ice cream, all at once Monthly   I eat a large amount of food even when I am not hungry Monthly   I eat rapidly Monthly   I eat alone because I feel embarrassed and do not want others to see how much I have eaten Never   I eat until I am uncomfortably full Monthly   I feel bad, disgusted, or guilty after I overeat Monthly     Physical Activity:  Barriers include being too tired and lack of time. Started pilates 3 weeks ago and is going 2x a week. 30 minutes of light exercise most days (walk, yoga). Trying to get her rings closed on her apple watch.         3/6/2025     9:40 AM   Activity/Exercise History   How much of a typical 12 hour day do you spend sitting? Most of the Day   How much of a typical 12 hour day do you spend lying down? Less Than Half the Day   How much of a typical day do you spend walking/standing? Less Than Half the Day   How many hours (not including work) do you spend on the TV/Video Games/Computer/Tablet/Phone? 6 Hours or More   How many times a week are you active for the purpose of exercise? 2-3 Times a Week   What keeps you from being more active? Lack of Time    Too tired    Other   How many total minutes do you spend doing some activity for the purpose of exercising when you exercise? More Than 30 Minutes     Nutrition Prescription  Recommended energy/nutrient modification.    Nutrition Diagnosis  Obesity r/t long history of positive energy  "balance aeb BMI >30.    Nutrition Intervention  Materials/education provided on hypocaloric diet for weight loss. Discussed importance of protein in diet while working on weight loss. Encouraged patient to have more awareness on how much she is eating throughout the day and to try to increase her overall intake. Provided examples of high protein foods and a goal to aim for.  Patient demonstrates understanding. Co-developed goals to work towards. Provided pt with list of goals and resources below via [employer].     Expected Engagement: good    Nutrition Goals  1) Increase overall protein intake in your diet.   2) Initial goal would be to aim for 60-90 grams of protein daily. With time could work up closer to 115 grams daily.     The Plate Method  [email or web address]    Protein Sources   [email or web address]     Quick/Easy Protein Sources:  Hard boiled eggs  Part-skim cheese sticks  Baby Bell cheese rounds  Low-fat/low-sugar Greek yogurt  Low-fat cottage cheese  Lean deli meat (chicken/turkey/ham)  Roasted chickpeas or lentils  Nuts   Turkey meat stick  Protein shake/bar  \"P3\" snack (cheese, nuts, deli meat)  Aldi's \"Protein Bread\"   \"Egglife\" egg white wrap    Tuna/salmon can/packet     Non-meat protein Ideas  Quinoa  Eggs  Dairy (Cottage cheese, low fat cheese, greek yogurt)   Nuts  Beans  Lentils  Protein pasta   Nutritional yeast  Garden of life raw meal powder  Liquid aminos  Homemade meats - a taco meat could be made with chopped cauliflower/mushroom as an example   Hummus (could do homemade if preferred)  Hemp hearts   Tofu  Seitan     Complex Carbohydrates high in protein  Quinoa  Brown Rice  Chick Pea  Buckwheat  Sweet Potatoes  Lentils  Legumes  Gilbert Beans  Black Beans   Farro   Kidney Beans     Mindful Eating  [email or web address]     Follow-Up: 2 months or as needed.     Time spent with patient: 29 minutes.    Janeth Sheets RD, LD        "
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
## 2025-03-11 NOTE — PATIENT INSTRUCTIONS
We talked about GLP1 medication (Wegovy, Zepbound), phentermine/topiramate, bupropion/naltrexone.    If you have any questions, please do not hesitate to call Weight management clinic at [phone] or [phone].  If you need to fax, please fax to [phone].    Sincerely,    Taz Berg MD  Endocrinology

## 2025-03-12 ENCOUNTER — VIRTUAL VISIT (OUTPATIENT)
Dept: ENDOCRINOLOGY | Facility: CLINIC | Age: 35
End: 2025-03-12
Payer: COMMERCIAL

## 2025-03-12 VITALS — HEIGHT: 63 IN | WEIGHT: 190 LBS | BODY MASS INDEX: 33.66 KG/M2

## 2025-03-12 DIAGNOSIS — Z71.3 NUTRITIONAL COUNSELING: Primary | ICD-10-CM

## 2025-03-12 PROCEDURE — 97802 MEDICAL NUTRITION INDIV IN: CPT | Mod: 95

## 2025-03-12 PROCEDURE — 99207 PR NO CHARGE LOS: CPT | Mod: 95

## 2025-03-12 ASSESSMENT — PATIENT HEALTH QUESTIONNAIRE - PHQ9
SUM OF ALL RESPONSES TO PHQ QUESTIONS 1-9: 12
SUM OF ALL RESPONSES TO PHQ QUESTIONS 1-9: 12
10. IF YOU CHECKED OFF ANY PROBLEMS, HOW DIFFICULT HAVE THESE PROBLEMS MADE IT FOR YOU TO DO YOUR WORK, TAKE CARE OF THINGS AT HOME, OR GET ALONG WITH OTHER PEOPLE: SOMEWHAT DIFFICULT

## 2025-03-12 NOTE — PATIENT INSTRUCTIONS
"Hi Loyda,     Follow-up with RD in 2 months or as needed.     Thank you,    Janeth Sheets RD, LD  If you would like to schedule or reschedule an appointment with the RD, please call [phone]    Nutrition Goals  1) Increase overall protein intake in your diet.   2) Initial goal would be to aim for 60-90 grams of protein daily. With time could work up closer to 115 grams daily.     The Plate Method  [email or web address]    Protein Sources   [email or web address]     Quick/Easy Protein Sources:  Hard boiled eggs  Part-skim cheese sticks  Baby Bell cheese rounds  Low-fat/low-sugar Greek yogurt  Low-fat cottage cheese  Lean deli meat (chicken/turkey/ham)  Roasted chickpeas or lentils  Nuts   Turkey meat stick  Protein shake/bar  \"P3\" snack (cheese, nuts, deli meat)  Aldi's \"Protein Bread\"   \"Egglife\" egg white wrap    Tuna/salmon can/packet     Non-meat protein Ideas  Quinoa  Eggs  Dairy (Cottage cheese, low fat cheese, greek yogurt)   Nuts  Beans  Lentils  Protein pasta   Nutritional yeast  Garden of life raw meal powder  Liquid aminos  Homemade meats - a taco meat could be made with chopped cauliflower/mushroom as an example   Hummus (could do homemade if preferred)  Hemp hearts   Tofu  Seitan     Complex Carbohydrates high in protein  Quinoa  Brown Rice  Chick Pea  Buckwheat  Sweet Potatoes  Lentils  Legumes  Gilbert Beans  Black Beans   Farro   Kidney Beans     Mindful Eating  [email or web address]     COMPREHENSIVE WEIGHT MANAGEMENT PROGRAM  VIRTUAL SUPPORT GROUPS    At [facility], our Comprehensive Weight Management program offers on-line support groups for patients who are working on weight loss and considering, preparing for, or have had weight loss surgery.     There is no cost for this opportunity.  You are invited to attend the?Virtual Support Groups?provided by any of the following locations:    [facility] via [employer] Teams with Ana Urias RD, RN  2.   [place] " via Microsoft Teams with Sameer Shields, PhD, LP  3.   [place] via Energy with Nallely Dan RN  4.   [facility] via a Zoom Meeting with BECCA George    The following Support Group information can also be found on our website:  [email or web address]      [facility]   WEIGHT LOSS SURGERY SUPPORT GROUP  The support group is a patient-lead forum that meets monthly to share experiences, encouragement and education. It is open to those who have had weight loss surgery, are scheduled for surgery, or are considering surgery.   WHEN: 3rd Wednesday of each month from 5:00PM - 6:00PM using Microsoft Teams.   FACILITATOR: Led by Ana Rosen RD, SHANON, RN, the program's Clinical Coordinator.   TO REGISTER: Please contact the clinic via [employer] or call the nurse line directly at [phone] to inform our staff that you would like an invite sent to you and to let us know the email you would like the invite sent to. Prior to the meeting, a link with directions on how to join the meeting will be sent to you.    2025 Meetings, 3rd Wednesday, 5:00pm - 6:00pm    January 15: Let's Talk  February19: Let's Talk  March 19: Speaker: Darin Ugarte RD, LD  April 16: Let's Talk  May 21: Speaker: Romina Richard RD, LD  June18: Let's Talk  July 16: Let's Talk  August 20: Let's Talk  September 17: No meeting.  October 15: Speaker: Sarah Santana PsyD, BATSHEVA  November 19: Let's Talk  December 17: Let's Talk    [facility] BARIATRIC CARE SUPPORT GROUP  This is open to all pre- and post- operative bariatric surgery patients as well as their support system.   WHEN: 3rd Tuesday of each month from 6:30 PM - 8:00 PM using Microsoft Teams.   FACILITATOR: Led by Sameer Shields, Ph.D who is a Licensed Psychologist with the [facility] Comprehensive Weight Management Program.   TO  REGISTER: Please send an email to Sameer Shields, Ph.D., LP at?[email or web address] you would like an invitation to the group. Prior to the meeting, a link with directions on how to join the meeting will be sent to you.    2025 Meetings, 3rd Tuesday January 21st: Open Forum  February 18th: Medications and Bariatric Surgery  Speaker: Monique Phoenix, [facility] Resident  March 18th: Open Forum  April 15th: Genetics of Obesity as well as Q&A, Speaker: Eliz Westfall MD, [facility] Comprehensive Weight Management Program.  May 20th: Open Forum  June 17th: Nutritional Labeling, Speaker: TBD  July 15th: Open Forum  August 19th: Open Forum  September 16th: Open Forum  October 21st: Open Forum  November 18th: Holiday Eating, Speaker: TBNADER  December 16th: Open Forum    [facility] POST-OPERATIVE BARIATRIC SURGERY SUPPORT GROUP  This is a support group for [facility] bariatric patients (and those external to [facility]) who have had bariatric surgery and are at least 3 months post-surgery.  WHEN: 4th Thursday of the month from 11:00 AM - 12:00 PM using Microsoft Teams.   FACILITATOR: Led by Certified Bariatric Nurse, Nallely Dan RN, CBN.   TO REGISTER: Please send an email to Nallely at [email or web address] if you would like an invitation to the group.  Prior to the meeting, a link with directions on how to join the meeting will be sent to you.    2025 Meetings, 4th Thursday, 11:00am - 12:pm  January 23  February 27  March 27  April 24  May 22  Ludy 26  July 24  August 28  September 25  October 23  November 27: Thanksgiving Day, No meeting.      December 25: Idania Day, No meeting.        [facility]   HEALTHY LIFESTYLE COACHING GROUP  This is a 60 minute virtual coaching group for those who want to lead a healthier lifestyle. Come together to set goals and overcome barriers in a  supportive group environment. We will address the four pillars of health: nutrition, exercise, sleep, and emotional well-being.   WHEN: 1st Friday of the month, 12:30 PM - 1:30 PM   using a Zoom meeting.     FACILITATOR: Led by National Board-Certified Health and , Nallely Isaac [facility].  TO REGISTER: Please call the [employer] at [phone] to register. You will get an appointment to attend in [employer]. Fifteen minutes prior to the meeting, complete the e-check in and you will get the link to join the meeting.  There is no charge to attend this group and space is limited.  Please register for each month you wish to attend    2025 Meetings, 1st Friday, 12:30pm-1:30pm  January 3  February 7  March 7: No meeting.  April 4  May 2  Ludy 6  July 4: Fourth of July Holiday, No meeting.    August 1  September 5  October 3  November 7  December 5

## 2025-03-12 NOTE — LETTER
"3/12/2025       RE: Loyda Liang  [address]  [place] [address]     Dear Colleague,    Thank you for referring your patient, Loyda Liang, to the [facility] [place] at [facility]. Please see a copy of my visit note below.    Video-Visit Details    Type of service:  Video Visit    Video Start Time:  1:00 PM  Video End Time:  1:29 PM     Originating Location (pt. Location): Home    Distant Location (provider location):  Offsite (providers home) [facility] [place]     Platform used for Video Visit: Ufora    New Weight Management Nutrition Consultation    Loyda Liang is a 34 year old female presents today for new weight management nutrition consultation.  Patient referred by Dr. Mcghee on March 11, 2025.    Patient with Co-morbidities of obesity including:      3/6/2025     9:40 AM   --   I have the following health issues associated with obesity None of the above   I have the following symptoms associated with obesity Depression    Back Pain    Fatigue    Hip Pain     Anthropometrics:  Initial consult weight: 191 lb on 3/11/25   Estimated body mass index is 33.83 kg/m  as calculated from the following:    Height as of 3/11/25: 1.6 m (5' 3\").    Weight as of 3/11/25: 86.6 kg (191 lb).    Medications for Weight Loss:  None currently-per provider, pt tried Metformin for ~3 months but did not help with weight loss. Had a discussion around various medications.     Does not have any specific goals for our visit today. Overall goals is to lose weight to help improve her overall health and be more spry.     NUTRITION HISTORY  Food allergies: NKFA  Food intolerances: Didn't discuss   Vitamin/Mineral Supplements: Omega 3   Previous methods of diet modification for weight loss: Metformin however pt reported that medication did not help.     RD before: No    Pt was on different mental health " "medications but stopped per pt stating \"they were not working\". While on medications, had gained 30 lbs and not been able to lose excess weight. Has high stress levels.     Tries to have a balanced meal. Does not eat much fast food. Tries to not eat many foods that are high in sugar.     Diet recall:   Per provider note pt is not eating a lot only eats about 1-2 meals per day.  Does not feeling hungry a lot and tries to eat veggie daily. Currently reported getting meals through [employer].     Some days where she won't eat at all due to some stomach problems.     Lives alone. Loves chicken and fish. Has tried whole 30 in the past. Has tried tracking in the past but finds that she is not able to be consistent.     Breakfast - favorite meal of the day but will typically have eggs, vegetable fried rice with Just Eggs brand product. If this meal is later in the morning she will not have a lunch   Lunch - Cook [place] meal OR leftovers   Dinner -  Chicken/fish   Snacks - cooked artichokes in a pouch, veggies. Maybe will have popcorn, rice crackers.   Hydration: Does not drink soda or alcohol, coffee in the morning on occasion         3/6/2025     9:40 AM   Diet Recall Review with Patient   If you do eat breakfast, what types of food do you eat? Eggs, egg fried rice, sometimes leftovers, it sort of depends   If you do eat lunch, what types of food do you typically eat? Wide variety, I'm getting meals through cook [employer] right now so I'll generally eat one of those, or maybe leftovers. Sometimes salad, it sort of depends   If you do eat supper, what types of food do you typically eat? Wide variety, I'm getting meals through cook [employer] right now so I'll generally eat one of those, or maybe leftovers. Sometimes salad, it sort of depends   If you do snack, what types of food do you typically eat? I don't eat a ton of snacks, I like crunchy veggies, I like tomatoes, if I'm going to have chips I'll have some cottage cheese with it, " sometimes other snack cheese, occasionally a lentil cup or instant ramen   How many glasses of juice do you drink in a typical day? 0   How many of glasses of milk do you drink in a typical day? 0   How many 8oz glasses of sugar containing drinks such as Hipolito-Aid/sweet tea do you drink in a day? 0   How many cans/bottles of sugar pop/soda/tea/sports drinks do you drink in a day? 0   How many cans/bottles of diet pop/soda/tea or sports drink do you drink in a day? 0   How often do you have a drink of alcohol? Never           3/6/2025     9:40 AM   Eating Habits   Generally, my meals include foods like these bread, pasta, rice, potatoes, corn, crackers, sweet dessert, pop, or juice Almost Everyday   Generally, my meals include foods like these fried meats, brats, burgers, french fries, pizza, cheese, chips, or ice cream Once a Week   Eat fast food (like McDonalds, Burger William, Dragonfly Bell) Less Than Weekly   Eat at a buffet or sit-down restaurant Less Than Weekly   Eat most of my meals in front of the TV or computer Almost Everyday   Often skip meals, eat at random times, have no regular eating times A Few Times a Week   Rarely sit down for a meal but snack or graze throughout Less Than Weekly   Eat extra snacks between meals Less Than Weekly   Eat most of my food at the end of the day Less Than Weekly   Eat in the middle of the night or wake up at night to eat Less Than Weekly   Eat extra snacks to prevent or correct low blood sugar Never   Eat to prevent acid reflux or stomach pain Once a Week   Worry about not having enough food to eat Never   I eat when I am depressed A Few Times a Week   I eat when I am stressed A Few Times a Week   I eat when I am bored Once a Week   I eat when I am anxious Less Than Weekly   I eat when I am happy or as a reward Almost Everyday   I feel hungry all the time even if I just have eaten Less Than Weekly   Feeling full is important to me Almost Everyday   I finish all the food on my  plate even if I am already full A Few Times a Week   I can't resist eating delicious food or walk past the good food/smell Less Than Weekly   I eat/snack without noticing that I am eating Less Than Weekly   I eat when I am preparing the meal Less Than Weekly   I eat more than usual when I see others eating Never   I have trouble not eating sweets, ice cream, cookies, or chips if they are around the house Less Than Weekly   I think about food all day Less Than Weekly   What foods, if any, do you crave? Cheese   Please list any other foods you crave? My cravings change/fluctuate. I don't always crave one thing, sometimes it has to do with my cycle, or how I'm feeling emotionally. It just depends.           3/6/2025     9:40 AM   Amount of Food   I feel out of control when eating Never   I eat a large amount of food, like a loaf of bread, a box of cookies, a pint/quart of ice cream, all at once Monthly   I eat a large amount of food even when I am not hungry Monthly   I eat rapidly Monthly   I eat alone because I feel embarrassed and do not want others to see how much I have eaten Never   I eat until I am uncomfortably full Monthly   I feel bad, disgusted, or guilty after I overeat Monthly     Physical Activity:  Barriers include being too tired and lack of time. Started pilates 3 weeks ago and is going 2x a week. 30 minutes of light exercise most days (walk, yoga). Trying to get her rings closed on her apple watch.         3/6/2025     9:40 AM   Activity/Exercise History   How much of a typical 12 hour day do you spend sitting? Most of the Day   How much of a typical 12 hour day do you spend lying down? Less Than Half the Day   How much of a typical day do you spend walking/standing? Less Than Half the Day   How many hours (not including work) do you spend on the TV/Video Games/Computer/Tablet/Phone? 6 Hours or More   How many times a week are you active for the purpose of exercise? 2-3 Times a Week   What keeps you  "from being more active? Lack of Time    Too tired    Other   How many total minutes do you spend doing some activity for the purpose of exercising when you exercise? More Than 30 Minutes     Nutrition Prescription  Recommended energy/nutrient modification.    Nutrition Diagnosis  Obesity r/t long history of positive energy balance aeb BMI >30.    Nutrition Intervention  Materials/education provided on hypocaloric diet for weight loss. Discussed importance of protein in diet while working on weight loss. Encouraged patient to have more awareness on how much she is eating throughout the day and to try to increase her overall intake. Provided examples of high protein foods and a goal to aim for.  Patient demonstrates understanding. Co-developed goals to work towards. Provided pt with list of goals and resources below via [employer].     Expected Engagement: good    Nutrition Goals  1) Increase overall protein intake in your diet.   2) Initial goal would be to aim for 60-90 grams of protein daily. With time could work up closer to 115 grams daily.     The Plate Method  [email or web address]    Protein Sources   [email or web address]     Quick/Easy Protein Sources:  Hard boiled eggs  Part-skim cheese sticks  Baby Bell cheese rounds  Low-fat/low-sugar Greek yogurt  Low-fat cottage cheese  Lean deli meat (chicken/turkey/ham)  Roasted chickpeas or lentils  Nuts   Turkey meat stick  Protein shake/bar  \"P3\" snack (cheese, nuts, deli meat)  Aldi's \"Protein Bread\"   \"Egglife\" egg white wrap    Tuna/salmon can/packet     Non-meat protein Ideas  Quinoa  Eggs  Dairy (Cottage cheese, low fat cheese, greek yogurt)   Nuts  Beans  Lentils  Protein pasta   Nutritional yeast  Garden of life raw meal powder  Liquid aminos  Homemade meats - a taco meat could be made with chopped cauliflower/mushroom as an example   Hummus (could do homemade if preferred)  Hemp hearts   Tofu  Seitan     Complex Carbohydrates high in " protein  Quinoa  Brown Rice  Chick Pea  Buckwheat  Sweet Potatoes  Lentils  Legumes  Gilbert Beans  Black Beans   Farro   Kidney Beans     Mindful Eating  [email or web address]     Follow-Up: 2 months or as needed.     Time spent with patient: 29 minutes.    Janeth Sheets RD, LD          Again, thank you for allowing me to participate in the care of your patient.      Sincerely,    Janeth Sheets RD

## 2025-03-12 NOTE — NURSING NOTE
Current patient location: [address] [place] [address] [address]  [place] [address]    Is the patient currently in the state of [place]? YES    Visit mode:VIDEO    If the visit is dropped, the patient can be reconnected by: VIDEO VISIT: Send to e-mail at: [email or web address]    Will anyone else be joining the visit? NO  (If patient encounters technical issues they should call [phone]    Are changes needed to the allergy or medication list? Pt stated no changes to allergies and Pt stated no med changes    Are refills needed on medications prescribed by this physician? NO    Reason for visit: Consult    Tess Montaño VVF    High phq2&9, decline triage nurse,  pt state they are seeing someone for depression already.

## 2025-07-13 ENCOUNTER — HEALTH MAINTENANCE LETTER (OUTPATIENT)
Age: 35
End: 2025-07-13